# Patient Record
Sex: MALE | Race: WHITE | Employment: FULL TIME | URBAN - METROPOLITAN AREA
[De-identification: names, ages, dates, MRNs, and addresses within clinical notes are randomized per-mention and may not be internally consistent; named-entity substitution may affect disease eponyms.]

---

## 2020-12-29 NOTE — PROGRESS NOTES
Chad Aguilar  [birth year]82 y.o. male   <[id number]>      HEARING AID EVALUATION    Chad Aguilar was seen today, 12/31/2020 , for a Hearing Aid Evaluation following audiologic evaluation on 10/15/20 by Dr. Teo Stover. Patient is an experienced hearing aid user, currently wearing Naidia V70 BTEs Patient is responsible for 100% of the purchasing cost at the time of fitting. It was explained to . Marla Caldwell that due to a lack of insurance information prior to today's appointment we were not able to verify insurance benefits for hearing aids. DATE: 12/31/2020     PROCEDURES:   I spent a significant portion of today's appointment discussing the option of cochlear implants with . Armida Cross. I provided him with contact information to [address] [place] [address] to schedule a cochlear implant evaluation as he may be a candidate. Patient is interested in this option, but would like to proceed with new hearing aids first.     LIFESTYLE EVALUATION:      Primary Complaint: Not hearing well in background noise. Patient reports he wants to be able to hear his sons at home better. Phone Difficulty:   Ear: right   Type: Cell iPhone 10   Is connectivity important to you?: Yes    Kingsley Use: yes - wants use of streaming and remote control    Group Activites: work    TV: does okay with current hearing aids      After a discussion of evaluation results, discussion of levels of technology and prices, and lifestyle assessment. Chad Aguilar has decided to consider the options and contact Audiology when a decision has been made. . Color P5,  power SP, Tab silicone FS molds, AU. Signed medical evaluation waiver and evaluation agreement. Technology to be considered: Phonak Donna M70 SP BTE. Patient cost due at time of fitting: $4750.00    Patient would like to see if his insurance will cover devices. I advised patient to call his insurance to check on this and let us know. I will also call next week as we are entering a new year.  I explained to the patient that once we receive a verification of benefits, any extraneous amount will be due at the time of fitting by the patient. We will submit the remainder to his insurance. I will not place an order until this has been agreed upon by both myself and the patient. He understands and agrees with this process. We will discuss cost after contacting insurance in the next week. EMIs obtained without incident today. I will send out to [facility] for FS silicone molds per request of the patient once we obtain approval from insurance and the patient. No charge for today's appointment. PATIENT EDUCATION:      - Verbally reviewed benefits and limitations of devices and available features in hearing aids. - Verbally discussed realistic expectations of hearing aid use     RECOMMENDATIONS:      - Contact Audiology when a decision has been made to pursue hearing aids.         Caryn Sandoval  Audiologist    NO CHARGE

## 2020-12-31 ENCOUNTER — PROCEDURE VISIT (OUTPATIENT)
Dept: AUDIOLOGY | Age: 40
End: 2020-12-31

## 2020-12-31 PROCEDURE — 99999 PR OFFICE/OUTPT VISIT,PROCEDURE ONLY: CPT | Performed by: AUDIOLOGIST

## 2020-12-31 NOTE — PATIENT INSTRUCTIONS
vibrations. These alert you to the doorbell, a ringing telephone, or a baby monitor. ? Television closed-captioning. This shows the words at the bottom of the screen. Most new TVs can do this. ? TTY (text telephone). This lets you type messages back and forth on the telephone instead of talking or listening. These devices are also called TDD. When messages are typed on the keyboard, they are sent over the phone line to a receiving TTY. The message is shown on a monitor. · Use pagers, fax machines, text, and email if it is hard for you to communicate by telephone. · Try to learn a listening technique called speech-reading. It is not lip-reading. You pay attention to people's gestures, expressions, posture, and tone of voice. These clues can help you understand what a person is saying. Face the person you are talking to, and have him or her face you. Make sure the lighting is good. You need to see the other person's face clearly. · Think about counseling if you need help to adjust to your hearing loss. When should you call for help? Watch closely for changes in your health, and be sure to contact your doctor if:    · You think your hearing is getting worse. · You have new symptoms, such as dizziness or nausea.

## 2021-01-05 ENCOUNTER — TELEPHONE (OUTPATIENT)
Dept: ENT CLINIC | Age: 41
End: 2021-01-05

## 2021-01-05 NOTE — TELEPHONE ENCOUNTER
Spoke with patient. Explained that Chelle Cooper will pay up to $3000 per ear (per device). If he wants more information regarding his out of pocket, he needs to call [employer]-the number on the back of the card. The patient expressed understanding and agreed that he needs to call [employer] for further clarification.

## 2021-01-07 ENCOUNTER — OFFICE VISIT (OUTPATIENT)
Dept: ENT CLINIC | Age: 41
End: 2021-01-07
Payer: COMMERCIAL

## 2021-01-07 VITALS
WEIGHT: 188 LBS | SYSTOLIC BLOOD PRESSURE: 118 MMHG | DIASTOLIC BLOOD PRESSURE: 74 MMHG | TEMPERATURE: 97.8 F | HEART RATE: 109 BPM

## 2021-01-07 DIAGNOSIS — H90.3 SENSORINEURAL HEARING LOSS (SNHL) OF BOTH EARS: Primary | ICD-10-CM

## 2021-01-07 DIAGNOSIS — H61.23 BILATERAL IMPACTED CERUMEN: ICD-10-CM

## 2021-01-07 DIAGNOSIS — H90.5 CONGENITAL DEAFNESS: ICD-10-CM

## 2021-01-07 PROCEDURE — 69210 REMOVE IMPACTED EAR WAX UNI: CPT | Performed by: OTOLARYNGOLOGY

## 2021-01-07 PROCEDURE — 99204 OFFICE O/P NEW MOD 45 MIN: CPT | Performed by: OTOLARYNGOLOGY

## 2021-01-07 NOTE — PROGRESS NOTES
[facility]      Patient Name: Jac Mcgill  Medical Record Number:  <[id number]>  Primary Care Physician:  Naina Peguero MD  Date of Consultation: 1/7/2021    Chief Complaint: Hearing loss, cerumen impactions        HISTORY OF PRESENT Neeta Briseno is a(n) 38 y.o. male who presents for evaluation of hearing loss and cerumen impactions. Patient presents for evaluation of hearing loss. 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 He has had hearing aids since he was 10years old. He does not personally feel as though he has had any significant changes to his hearing in the last several years. Although he has very severe sensorineural hearing loss, he thinks that he does really well with hearing aids. They have discussed cochlear implants in the past, but so far he is not interested. In addition he does not have his ears cleaned out every few months because he gets wax impactions from the hearing aids. Otherwise no new complaints.       Social History     Socioeconomic History    Marital status:      Spouse name: Not on file    Number of children: Not on file    Years of education: Not on file    Highest education level: Not on file   Occupational History    Not on file   Social Needs    Financial resource strain: Not on file    Food insecurity     Worry: Not on file     Inability: Not on file    Transportation needs     Medical: Not on file     Non-medical: Not on file   Tobacco Use    Smoking status: Current Every Day Smoker     Packs/day: 1.00     Years: 22.00     Pack years: 22.00     Types: Cigarettes    Smokeless tobacco: Never Used   Substance and Sexual Activity    Alcohol use: Not on file    Drug use: Not on file    Sexual activity: Not on file   Lifestyle    Physical activity     Days per week: Not on file     Minutes per session: Not on file    Stress: Not on file   Relationships    Social connections     Talks on phone: Not on file     Gets together: Not on file     Attends [employer] service: Not on file     Active member of club or organization: Not on file     Attends meetings of clubs or organizations: Not on file     Relationship status: Not on file    Intimate partner violence     Fear of current or ex partner: Not on file     Emotionally abused: Not on file     Physically abused: Not on file     Forced sexual activity: Not on file   Other Topics Concern    Not on file   Social History Narrative    Not on file       DRUG/FOOD ALLERGIES: Patient has no known allergies. CURRENT MEDICATIONS  Prior to Admission medications    Not on File       REVIEW OF SYSTEMS  The following systems were reviewed and revealed the following in addition to any already discussed in the HPI:    CONSTITUTIONAL: no weight loss, no fever, no night sweats, no chills  EYES: no vision changes, no blurry vision  EARS: Hearing loss  NOSE: no epistaxis, no rhinorrhea  RESPIRATORY: no  Difficulty breathing, no shortness of breath  CV: no chest pain, no Peripheral vascular disease  HEME: No coagulation disorder, no Bleeding disorder  NEURO: no TIA or stroke-like symptoms  SKIN: No new rashes in the head and neck, no recent skin cancers  MOUTH: No new ulcers, no recent teeth infections  GASTROINTESTINAL: No diarrhea, stomach pain  PSYCH: No anxiety, no depression      PHYSICAL EXAM  /74 (Site: Left Upper Arm, Position: Sitting, Cuff Size: Large Adult)   Pulse 109   Temp 97.8 °F (36.6 °C) (Temporal)   Wt 188 lb (85.3 kg)     GENERAL: No Acute Distress, Alert and Oriented, no Hoarseness, strong voice  EYES: EOMI, Anti-icteric  HENT:   Head: Normocephalic and atraumatic.    Face:  Symmetric, facial nerve intact, no sinus tenderness   ears: See below  Mouth/Oral Cavity:  normal lips, Uvula is midline, no mucosal lesions, no trismus, normal dentition, normal salivary quality/flow  Oropharynx/Larynx:  normal oropharynx, normal tonsils; normal larynx/nasopharynx on mirror exam  Nose:Normal external nasal appearance. Anterior rhinoscopy shows a normal septum. Normal turbinates. Normal mucosa   NECK: Normal range of motion, no thyromegaly, trachea is midline, no lymphadenopathy, no neck masses, no crepitus  CHEST: Normal respiratory effort, no retractions, breathing comfortably  SKIN: No rashes, normal appearing skin, no evidence of skin lesions/tumors  Neuro:  cranial nerve II-XII intact; normal gait  Cardio:  no edema    PROCEDURE  Bilateral ear exam  Right ears visualized binoculars scope. The patient had a fairly dense cerumen impaction lying at the surface of tympanic membrane that I removed with a Alanis suction. The left side he had even more dense cerumen impaction that I removed with a combination of a Alanis suction, right angle and alligator forceps. Tympanic membrane was somewhat thickened, but there appeared to be an aerated middle ear      Patient had a hearing test in outside facility a couple of weeks ago that showed essentially severe to profound sensorineural hearing loss bilaterally        ASSESSMENT/PLAN  1. Sensorineural hearing loss (SNHL) of both ears  This patient's hearing loss certainly is in the range that you would consider cochlear implant, however he has been using hearing aids effectively for his entire life. He really just wants to get a new set. I think this is reasonable as he seemed to do pretty good during the exam today as far as communicating even with her mask on. He can follow-up in 3 to 6 months to clean out his ears. 2. Congenital deafness  As above    3. Bilateral impacted cerumen  Removed today in clinic follow-up in 3 to 6 months             I have performed a head and neck physical exam personally or was physically present during the key or critical portions of the service. Medical Decision Making:   The following items were considered in medical decision making:  Independent review of images  Review / order clinical lab tests  Review / order radiology tests  Decision to obtain old records

## 2021-01-15 ENCOUNTER — TELEPHONE (OUTPATIENT)
Dept: AUDIOLOGY | Age: 41
End: 2021-01-15

## 2021-01-15 NOTE — TELEPHONE ENCOUNTER
Returned call to patient and left voicemail. Patient's insurance has told us that he has an up to $3000.00 per ear benefit. At this time his expected out of pocket expense would be $0.00 as the hearing aids discussed in . [facility] are within this benefit. I also explained that the information provided to us from insurance is a verification of benefits and not a guarantee of coverage. Should his insurance not cover at 100% the remaining bill would be between Mr. Elo Khan and his insurance and not [facility] ([facility]). I instructed the patient to call the [place] office if he is interested in ordering devices.

## 2021-01-15 NOTE — TELEPHONE ENCOUNTER
Pt calling to see if Dr Fito Damico knows what his out of pocket expense will be for hearing aids, is requesting she call him to discuss

## 2021-01-18 NOTE — TELEPHONE ENCOUNTER
Patient called back and wants to proceed with ordering the hearing aids that have been discussed. I informed the patient that someone would call him to schedule a hearing aid fitting after the hearing aids come in.

## 2021-02-01 NOTE — PROGRESS NOTES
device manual.          Patient paid $0.00 of total $4750.00 (bill $4750.00 to insurance as patient has a $3000.00 per ear benefit). RECOMMENDATIONS:     - Return for follow-up appointment within 30-day right-to-return period      Caryn Lara  Audiologist    Patient paid $0.00 bill full amount of $4750.00 to insurance.

## 2021-02-04 ENCOUNTER — PROCEDURE VISIT (OUTPATIENT)
Dept: AUDIOLOGY | Age: 41
End: 2021-02-04
Payer: COMMERCIAL

## 2021-02-04 DIAGNOSIS — H90.3 SENSORINEURAL HEARING LOSS (SNHL) OF BOTH EARS: Primary | ICD-10-CM

## 2021-02-04 PROCEDURE — V5264 EAR MOLD/INSERT: HCPCS | Performed by: AUDIOLOGIST

## 2021-02-04 PROCEDURE — V5160 DISPENSING FEE BINAURAL: HCPCS | Performed by: AUDIOLOGIST

## 2021-02-04 PROCEDURE — V5261 HEARING AID, DIGIT, BIN, BTE: HCPCS | Performed by: AUDIOLOGIST

## 2021-02-04 PROCEDURE — V5020 CONFORMITY EVALUATION: HCPCS | Performed by: AUDIOLOGIST

## 2021-02-04 PROCEDURE — V5011 HEARING AID FITTING/CHECKING: HCPCS | Performed by: AUDIOLOGIST

## 2021-02-04 NOTE — PATIENT INSTRUCTIONS
Learning About Hearing Aids  What is a hearing aid? A hearing aid makes sounds louder. It can help some people with hearing problems to hear better. Hearing aids do not restore normal hearing. But they can make it easier to communicate by making sounds clearer. · Digital programmable hearing aids can adjust themselves to work best where you are at any time. You also have more choices in setting them up than with analog hearing aids. There are also different styles of hearing aids. · A behind-the-ear (BTE) hearing aid connects to a plastic ear mold that fits inside the outer ear. BTE hearing aids are used for all levels of hearing loss, especially very severe hearing loss. They may be better for children for safety and growth reasons. Poorly fitting BTE ear molds or a buildup of earwax may cause a whistling sound (feedback). · An in-the-ear (ITE) hearing aid fits in the outer part of the ear. It can be used by people with mild to severe hearing loss. ITE hearing aids can be used with other hearing devices, such as a telecoil that improves hearing during phone calls. ITE hearing aids can be damaged by earwax and fluid draining from the ear. Their small size may be hard for some people to handle. They are not often used in children because the case must be replaced as the child grows. · An in-the-canal (ITC) hearing aid fits into the ear canal. ITC hearing aids are used by people with mild to moderate hearing loss. They are made to fit the shape and the size of your ear canal. They can be damaged by earwax and fluid draining from the ear. Their small size may be hard for some people to handle. They are not made for children. You have some options if you have a hearing problem and are thinking about getting hearing aids. You can go to your doctor or an audiologist. He or she will do a hearing test and help you decide which type and style of hearing aid may be best for you.      What else should I know about hearing aids? Find out if your insurance covers hearing aids. They can be expensive. Different types of hearing aids come with different costs. Also find out about a warranty or return policy in case you are not happy with your hearing aids. Follow-up care is a key part of your treatment and safety. Be sure to make and go to all appointments, and call your doctor if you are having problems. It's also a good idea to know your test results and keep a list of the medicines you take. Where can you learn more? Go to [email or web address] and sign in to your [employer] account. Enter H085 in the Trendr box to learn more about \"Learning About Hearing Aids. \"     If you do not have an account, please click on the \"Sign Up Now\" link. Current as of: October 21, 2018  Content Version: 12.1  © 5990-3489 Healthwise, Incorporated. Care instructions adapted under license by [facility] ([facility]). If you have questions about a medical condition or this instruction, always ask your healthcare professional. [address] any warranty or liability for your use of this information.

## 2021-02-05 NOTE — PROGRESS NOTES
Lori Hutchison   1980, 38 y.o. male   <[id number]>     HEARING AID FITTING FOLLOW-UP  RIGHT EAR: /MODEL: Phonak Donna M70 SP  SN: [id number]  EARMOLD/TUBING: [id number]/ 13 thick    LEFT EAR: /MODEL: Phonak Donna M70 SP  SN: [id number]  EARMOLD/TUBING: [id number]/ 13 thick    BATTERY: 13  HAF: 2/4/21  HA WARRANTY: 4/3/23  EM WARRANTY: 4/15/21  TRIAL PERIOD ENDS:3/4/21     BUTTONS: ENABLED  PROGRAMS: Celiro  PHONE CONNECTIVITY: PAIRED TO PHONE AND TOSIN    Lori Hutchison was seen today, 2/8/2021,  for first follow-up after being fit with Phonak Donna M70 SP BTE  hearing aids. PROCEDURES:     Patient is using his old EMs as he prefers the sound quality. Will RFC new molds today per patient. Replacements ordered (Tab, silicone, long canal, small vent). Ran new feedback manager. Increased MPO. Patient is satisfied with sound quality. We discussed limitations of hearing aids again today. I still advised Mr. Debbie Culver to pursue a CIE. Patient states he will think about this option but he is happy with the hearing aids for now. Patient Education:       - Verbally and visually reviewed care and maintenance of devices. - Reviewed Hearing Aid Fitting checklist and Troubleshooting document given to patient. No charge for today's appointment. RECOMMENDATIONS:     1. Continue wearing both HAs during all waking hours. 2. Return for HA recall 2.  3. Monitor hearing and retest if changes are noticed.     Caryn Laws  Audiologist    NO CHARGE

## 2021-02-08 ENCOUNTER — PROCEDURE VISIT (OUTPATIENT)
Dept: AUDIOLOGY | Age: 41
End: 2021-02-08

## 2021-02-08 DIAGNOSIS — H90.3 SENSORINEURAL HEARING LOSS (SNHL) OF BOTH EARS: Primary | ICD-10-CM

## 2021-02-08 PROCEDURE — 99999 PR OFFICE/OUTPT VISIT,PROCEDURE ONLY: CPT | Performed by: AUDIOLOGIST

## 2021-02-16 NOTE — PROGRESS NOTES
Vin Serna   1980, 38 y.o. male   <[id number]>     HEARING AID FITTING FOLLOW-UP  RIGHT EAR: /MODEL: Phonak Donna M70 SP  RU: [id number]  EARMOLD/TUBING: [id number]/ 13 thick    LEFT EAR: /MODEL: Phonak Donna M70 SP  DQ: [id number]  EARMOLD/TUBING: [id number]/ 13 thick    BATTERY: 13  HAF: 2/4/21  HA WARRANTY: 4/3/23  EM WARRANTY: 4/15/21  TRIAL PERIOD ENDS:3/4/21     BUTTONS: ENABLED  PROGRAMS: Symcircle  PHONE CONNECTIVITY: PAIRED TO PHONE AND TOSIN    Vin Serna was seen today, 2/18/2021,  for first follow-up after being fit with [facility] hearing aids. Patient notes overall he is doing well. Primary complaint is with Bluetooth. He states the car and the hearing aids \"fight\" over the connection to his phone. He also notes that when he is streaming music, the sound cuts out frequently. New ear molds have not arrived yet due to weather delays. I will extend his trial period to 4/5/21 to allow for trial with new ear molds. PROCEDURES:     Hearing aids were cleaned and checked. A listening check revealed good function of the devices. Microphone and  ports were suctioned,  earmolds were cleaned. Post-cleaning listening check revealed excellent function of the devices. Replaced tubing AU, cemented in. Changed bandwidth to adaptive. Patient is satisfied with services today. He will return once new EMs arrive. Patient Education:       - Verbally and visually reviewed care and maintenance of devices. - Reviewed Hearing Aid Fitting checklist and Troubleshooting document given to patient. No charge for today's appointment. RECOMMENDATIONS:     1. Continue wearing both HAs during all waking hours. 2. Return for HA recall when EMs arrive  3. HAC every 3-6 months. 4. Monitor hearing and retest if changes are noticed.     Caryn Santillan  Audiologist    NO CHARGE

## 2021-02-18 ENCOUNTER — PROCEDURE VISIT (OUTPATIENT)
Dept: AUDIOLOGY | Age: 41
End: 2021-02-18

## 2021-02-18 DIAGNOSIS — H90.3 SENSORINEURAL HEARING LOSS (SNHL) OF BOTH EARS: Primary | ICD-10-CM

## 2021-02-18 PROCEDURE — 99999 PR OFFICE/OUTPT VISIT,PROCEDURE ONLY: CPT | Performed by: AUDIOLOGIST

## 2021-02-18 NOTE — PATIENT INSTRUCTIONS
Learning About Hearing Aids  What is a hearing aid? A hearing aid makes sounds louder. It can help some people with hearing problems to hear better. Hearing aids do not restore normal hearing. But they can make it easier to communicate by making sounds clearer. · Digital programmable hearing aids can adjust themselves to work best where you are at any time. You also have more choices in setting them up than with analog hearing aids. There are also different styles of hearing aids. · A behind-the-ear (BTE) hearing aid connects to a plastic ear mold that fits inside the outer ear. BTE hearing aids are used for all levels of hearing loss, especially very severe hearing loss. They may be better for children for safety and growth reasons. Poorly fitting BTE ear molds or a buildup of earwax may cause a whistling sound (feedback). · An in-the-ear (ITE) hearing aid fits in the outer part of the ear. It can be used by people with mild to severe hearing loss. ITE hearing aids can be used with other hearing devices, such as a telecoil that improves hearing during phone calls. ITE hearing aids can be damaged by earwax and fluid draining from the ear. Their small size may be hard for some people to handle. They are not often used in children because the case must be replaced as the child grows. · An in-the-canal (ITC) hearing aid fits into the ear canal. ITC hearing aids are used by people with mild to moderate hearing loss. They are made to fit the shape and the size of your ear canal. They can be damaged by earwax and fluid draining from the ear. Their small size may be hard for some people to handle. They are not made for children. You have some options if you have a hearing problem and are thinking about getting hearing aids. You can go to your doctor or an audiologist. He or she will do a hearing test and help you decide which type and style of hearing aid may be best for you.      What else should I know

## 2021-02-24 NOTE — PROGRESS NOTES
Lori Hutchison   1980, 38 y.o. male   <[id number]>     HEARING AID CHECK  RIGHT EAR: /MODEL: Phonak Donna M70 SP  CC: [id number]  EARMOLD/TUBING: [id number]/ 13 thick    LEFT EAR: /MODEL: Phonak Donna M70 SP  TV: [id number]  EARMOLD/TUBING: [id number]/ 13 thick    BATTERY: 13  HAF: 2/4/21  HA WARRANTY: 4/3/23  EM WARRANTY: 5/12/21  TRIAL PERIOD ENDS:3/4/21     BUTTONS: ENABLED  PROGRAMS: AUTOSENFansUnite  PHONE CONNECTIVITY: PAIRED TO PHONE AND TOSIN      Lori Hutchison was seen today, 2/25/2021, for a hearing aid check appointment. Fit with replacement EMs today. Patient was not satisfied with the fit. Will send in for remake as they are still in warranty. Took new EMIs today that will be sent with EMs. Enabled speech recover. Enhanced wind guard.      RECOMMENDATIONS:  - Continue consistent hearing aid use  - Return for hearing aid checks as needed  - Will call patient once new EMs are in      Hoang Caryn Gonzalez  Audiologist      NO CHARGE

## 2021-02-25 ENCOUNTER — PROCEDURE VISIT (OUTPATIENT)
Dept: AUDIOLOGY | Age: 41
End: 2021-02-25

## 2021-02-25 DIAGNOSIS — H90.3 SENSORINEURAL HEARING LOSS (SNHL) OF BOTH EARS: Primary | ICD-10-CM

## 2021-02-25 PROCEDURE — 99999 PR OFFICE/OUTPT VISIT,PROCEDURE ONLY: CPT | Performed by: AUDIOLOGIST

## 2021-02-25 NOTE — PATIENT INSTRUCTIONS
Learning About Hearing Aids  What is a hearing aid? A hearing aid makes sounds louder. It can help some people with hearing problems to hear better. Hearing aids do not restore normal hearing. But they can make it easier to communicate by making sounds clearer. · Digital programmable hearing aids can adjust themselves to work best where you are at any time. You also have more choices in setting them up than with analog hearing aids. There are also different styles of hearing aids. · A behind-the-ear (BTE) hearing aid connects to a plastic ear mold that fits inside the outer ear. BTE hearing aids are used for all levels of hearing loss, especially very severe hearing loss. They may be better for children for safety and growth reasons. Poorly fitting BTE ear molds or a buildup of earwax may cause a whistling sound (feedback). · An in-the-ear (ITE) hearing aid fits in the outer part of the ear. It can be used by people with mild to severe hearing loss. ITE hearing aids can be used with other hearing devices, such as a telecoil that improves hearing during phone calls. ITE hearing aids can be damaged by earwax and fluid draining from the ear. Their small size may be hard for some people to handle. They are not often used in children because the case must be replaced as the child grows. · An in-the-canal (ITC) hearing aid fits into the ear canal. ITC hearing aids are used by people with mild to moderate hearing loss. They are made to fit the shape and the size of your ear canal. They can be damaged by earwax and fluid draining from the ear. Their small size may be hard for some people to handle. They are not made for children. You have some options if you have a hearing problem and are thinking about getting hearing aids. You can go to your doctor or an audiologist. He or she will do a hearing test and help you decide which type and style of hearing aid may be best for you.      What else should I know about hearing aids? Find out if your insurance covers hearing aids. They can be expensive. Different types of hearing aids come with different costs. Also find out about a warranty or return policy in case you are not happy with your hearing aids. Follow-up care is a key part of your treatment and safety. Be sure to make and go to all appointments, and call your doctor if you are having problems. It's also a good idea to know your test results and keep a list of the medicines you take. Where can you learn more? Go to https://Arden ReedpepicewJunar.trbo GmbH. org and sign in to your [employer] account. Enter R130 in the lifecake box to learn more about \"Learning About Hearing Aids. \"     If you do not have an account, please click on the \"Sign Up Now\" link. Current as of: October 21, 2018  Content Version: 12.1  © 4643-2172 Healthwise, Incorporated. Care instructions adapted under license by [facility] ([facility]). If you have questions about a medical condition or this instruction, always ask your healthcare professional. [address] any warranty or liability for your use of this information.

## 2021-03-11 ENCOUNTER — TELEPHONE (OUTPATIENT)
Dept: ENT CLINIC | Age: 41
End: 2021-03-11

## 2021-03-15 ENCOUNTER — TELEPHONE (OUTPATIENT)
Dept: ENT CLINIC | Age: 41
End: 2021-03-15

## 2021-03-15 NOTE — TELEPHONE ENCOUNTER
Tried to call patient. Message says that number has been changed or disconnected. Will try to reach one of his emergency contacts.

## 2021-04-20 NOTE — PROGRESS NOTES
Lisa Porter   1980, 38 y.o. male   <[id number]>     HEARING AID CHECK  RIGHT EAR: /MODEL: Phonak Donna M70 SP  LX: [id number]  EARMOLD/TUBING: [id number]/ 13 thick TRS    LEFT EAR: /MODEL: Phonak Donna M70 SP  IY: [id number]  EARMOLD/TUBING: [id number]/ 13 thick TRS   BATTERY: 13  HAF: 2/4/21  HA WARRANTY: 4/3/23  EM WARRANTY: 5/12/21     BUTTONS: ENABLED  PROGRAMS: [employer]  PHONE CONNECTIVITY: PAIRED TO PHONE AND TOSIN      Lisa Porter was seen today, 4/21/2021, for a hearing aid check appointment. Fit patient with new earmolds, he noted good fit. Re-ran feedback manager with devices. Patient reports good sound quality. Speech mapping NAL-NAL2 shows good benefit from devices.          RECOMMENDATIONS:  - Continue consistent hearing aid use  - Return for hearing aid checks as needed      Caryn Blue  Audiologist    NO CHARGE

## 2021-04-21 ENCOUNTER — PROCEDURE VISIT (OUTPATIENT)
Dept: AUDIOLOGY | Age: 41
End: 2021-04-21

## 2021-04-21 DIAGNOSIS — H90.3 SENSORINEURAL HEARING LOSS (SNHL) OF BOTH EARS: Primary | ICD-10-CM

## 2021-04-21 PROCEDURE — 99999 PR OFFICE/OUTPT VISIT,PROCEDURE ONLY: CPT | Performed by: AUDIOLOGIST

## 2021-04-21 NOTE — PATIENT INSTRUCTIONS
Learning About Hearing Aids  What is a hearing aid? A hearing aid makes sounds louder. It can help some people with hearing problems to hear better. Hearing aids do not restore normal hearing. But they can make it easier to communicate by making sounds clearer. · Digital programmable hearing aids can adjust themselves to work best where you are at any time. You also have more choices in setting them up than with analog hearing aids. There are also different styles of hearing aids. · A behind-the-ear (BTE) hearing aid connects to a plastic ear mold that fits inside the outer ear. BTE hearing aids are used for all levels of hearing loss, especially very severe hearing loss. They may be better for children for safety and growth reasons. Poorly fitting BTE ear molds or a buildup of earwax may cause a whistling sound (feedback). · An in-the-ear (ITE) hearing aid fits in the outer part of the ear. It can be used by people with mild to severe hearing loss. ITE hearing aids can be used with other hearing devices, such as a telecoil that improves hearing during phone calls. ITE hearing aids can be damaged by earwax and fluid draining from the ear. Their small size may be hard for some people to handle. They are not often used in children because the case must be replaced as the child grows. · An in-the-canal (ITC) hearing aid fits into the ear canal. ITC hearing aids are used by people with mild to moderate hearing loss. They are made to fit the shape and the size of your ear canal. They can be damaged by earwax and fluid draining from the ear. Their small size may be hard for some people to handle. They are not made for children. You have some options if you have a hearing problem and are thinking about getting hearing aids. You can go to your doctor or an audiologist. He or she will do a hearing test and help you decide which type and style of hearing aid may be best for you.      What else should I know about hearing aids? Find out if your insurance covers hearing aids. They can be expensive. Different types of hearing aids come with different costs. Also find out about a warranty or return policy in case you are not happy with your hearing aids. Follow-up care is a key part of your treatment and safety. Be sure to make and go to all appointments, and call your doctor if you are having problems. It's also a good idea to know your test results and keep a list of the medicines you take. Where can you learn more? Go to https://RentamuspepicewSecureAlert.BATTERIES [email or web address] BANDS. org and sign in to your [employer] account. Enter F447 in the [employer] box to learn more about \"Learning About Hearing Aids. \"     If you do not have an account, please click on the \"Sign Up Now\" link. Current as of: October 21, 2018  Content Version: 12.1  © 6167-2818 Healthwise, Incorporated. Care instructions adapted under license by [facility] ([facility]). If you have questions about a medical condition or this instruction, always ask your healthcare professional. [address] any warranty or liability for your use of this information.

## 2021-04-22 ENCOUNTER — TELEPHONE (OUTPATIENT)
Dept: AUDIOLOGY | Age: 41
End: 2021-04-22

## 2021-04-22 ENCOUNTER — PROCEDURE VISIT (OUTPATIENT)
Dept: AUDIOLOGY | Age: 41
End: 2021-04-22

## 2021-04-22 DIAGNOSIS — H90.3 SENSORINEURAL HEARING LOSS (SNHL) OF BOTH EARS: Primary | ICD-10-CM

## 2021-04-22 PROCEDURE — 99999 PR OFFICE/OUTPT VISIT,PROCEDURE ONLY: CPT | Performed by: AUDIOLOGIST

## 2021-04-22 NOTE — PROGRESS NOTES
Jennie Molina   1980, 38 y.o. male   <[id number]>     HEARING AID CHECK    RIGHT EAR: /MODEL: Phonak Donna M70 SP  MQ: [id number]  EARMOLD/TUBING: [id number]/ 13 thick TRS    LEFT EAR: /MODEL: Phonak Donna M70 SP  GM: [id number]  EARMOLD/TUBING: [id number]/ 13 thick TRS   BATTERY: 13  HAF: 2/4/21  HA WARRANTY: 4/3/23  EM WARRANTY: 5/12/21     BUTTONS: ENABLED  PROGRAMS: "[employer]  PHONE CONNECTIVITY: PAIRED TO PHONE AND TOSIN    Jennie Molina was seen today, 4/22/2021, for a hearing aid check appointment. Patient noted he has been getting feedback since being fit with the new ear molds. Sending back for second remake, will contact [employer] to see if this can be a rush order. Will call patient when EMs are back and ready to be fit.          RECOMMENDATIONS:  - Continue consistent hearing aid use  - Return for remake EMs when back from Caryn Overton  Audiologist    NO CHARGE    NO CHARGE

## 2021-04-23 ENCOUNTER — TELEPHONE (OUTPATIENT)
Dept: AUDIOLOGY | Age: 41
End: 2021-04-23

## 2021-04-23 NOTE — TELEPHONE ENCOUNTER
Pt calling, requesting call from Dr Rupal La regarding his hearing test. Pt aware Dr Rupal La out of office on Fridays.

## 2021-04-23 NOTE — TELEPHONE ENCOUNTER
Returned call to patient. He wanted to review his audiogram from [facility]. We discussed again that he has a bilateral severe to profound SNHL. Patient has very poor WRS bilaterally. He states that \"it just hit home\" that he should pursue the cochlear implant evaluation as I had advised. Again reviewed contact information to [address] [place] [address] to schedule a cochlear implant evaluation (CIE). Advised patient to bring a copy of his most recent audiogram to this appointment. Patient states he is going to schedule a CIE and will contact me if he has further questions.

## 2021-05-07 ENCOUNTER — TELEPHONE (OUTPATIENT)
Dept: ENT CLINIC | Age: 41
End: 2021-05-07

## 2021-05-09 ENCOUNTER — APPOINTMENT (OUTPATIENT)
Dept: CT IMAGING | Age: 41
End: 2021-05-09
Payer: COMMERCIAL

## 2021-05-09 ENCOUNTER — HOSPITAL ENCOUNTER (EMERGENCY)
Age: 41
Discharge: LEFT BEFORE DISCHARGE | End: 2021-05-09
Attending: EMERGENCY MEDICINE
Payer: COMMERCIAL

## 2021-05-09 VITALS
SYSTOLIC BLOOD PRESSURE: 130 MMHG | HEART RATE: 80 BPM | OXYGEN SATURATION: 99 % | WEIGHT: 193.56 LBS | TEMPERATURE: 97.5 F | RESPIRATION RATE: 16 BRPM | DIASTOLIC BLOOD PRESSURE: 78 MMHG

## 2021-05-09 DIAGNOSIS — F10.920 ACUTE ALCOHOLIC INTOXICATION WITHOUT COMPLICATION (HCC): Primary | ICD-10-CM

## 2021-05-09 LAB
A/G RATIO: 1.8 (ref 1.1–2.2)
ACETAMINOPHEN LEVEL: <5 UG/ML (ref 10–30)
ALBUMIN SERPL-MCNC: 4.5 G/DL (ref 3.4–5)
ALP BLD-CCNC: 54 U/L (ref 40–129)
ALT SERPL-CCNC: 13 U/L (ref 10–40)
AMPHETAMINE SCREEN, URINE: NORMAL
ANION GAP SERPL CALCULATED.3IONS-SCNC: 19 MMOL/L (ref 3–16)
AST SERPL-CCNC: 13 U/L (ref 15–37)
BARBITURATE SCREEN URINE: NORMAL
BASOPHILS ABSOLUTE: 0.1 K/UL (ref 0–0.2)
BASOPHILS RELATIVE PERCENT: 0.4 %
BENZODIAZEPINE SCREEN, URINE: NORMAL
BILIRUB SERPL-MCNC: 0.4 MG/DL (ref 0–1)
BUN BLDV-MCNC: 11 MG/DL (ref 7–20)
CALCIUM SERPL-MCNC: 8.8 MG/DL (ref 8.3–10.6)
CANNABINOID SCREEN URINE: NORMAL
CHLORIDE BLD-SCNC: 102 MMOL/L (ref 99–110)
CHP ED QC CHECK: NORMAL
CO2: 19 MMOL/L (ref 21–32)
COCAINE METABOLITE SCREEN URINE: NORMAL
CREAT SERPL-MCNC: 0.9 MG/DL (ref 0.9–1.3)
EOSINOPHILS ABSOLUTE: 0.3 K/UL (ref 0–0.6)
EOSINOPHILS RELATIVE PERCENT: 1.9 %
ETHANOL: 118 MG/DL (ref 0–0.08)
GFR AFRICAN AMERICAN: >60
GFR NON-AFRICAN AMERICAN: >60
GLOBULIN: 2.5 G/DL
GLUCOSE BLD-MCNC: 84 MG/DL (ref 70–99)
GLUCOSE BLD-MCNC: 86 MG/DL
GLUCOSE BLD-MCNC: 86 MG/DL (ref 70–99)
HCT VFR BLD CALC: 46.1 % (ref 40.5–52.5)
HEMOGLOBIN: 15.9 G/DL (ref 13.5–17.5)
LYMPHOCYTES ABSOLUTE: 3.8 K/UL (ref 1–5.1)
LYMPHOCYTES RELATIVE PERCENT: 24.8 %
Lab: NORMAL
MCH RBC QN AUTO: 29.9 PG (ref 26–34)
MCHC RBC AUTO-ENTMCNC: 34.5 G/DL (ref 31–36)
MCV RBC AUTO: 86.7 FL (ref 80–100)
METHADONE SCREEN, URINE: NORMAL
MONOCYTES ABSOLUTE: 0.9 K/UL (ref 0–1.3)
MONOCYTES RELATIVE PERCENT: 5.9 %
NEUTROPHILS ABSOLUTE: 10.2 K/UL (ref 1.7–7.7)
NEUTROPHILS RELATIVE PERCENT: 67 %
OPIATE SCREEN URINE: NORMAL
OXYCODONE URINE: NORMAL
PDW BLD-RTO: 14.3 % (ref 12.4–15.4)
PERFORMED ON: NORMAL
PH UA: 6
PHENCYCLIDINE SCREEN URINE: NORMAL
PLATELET # BLD: 259 K/UL (ref 135–450)
PMV BLD AUTO: 7.7 FL (ref 5–10.5)
POTASSIUM SERPL-SCNC: 3.5 MMOL/L (ref 3.5–5.1)
PROPOXYPHENE SCREEN: NORMAL
RBC # BLD: 5.31 M/UL (ref 4.2–5.9)
SALICYLATE, SERUM: <0.3 MG/DL (ref 15–30)
SODIUM BLD-SCNC: 140 MMOL/L (ref 136–145)
TOTAL PROTEIN: 7 G/DL (ref 6.4–8.2)
WBC # BLD: 15.2 K/UL (ref 4–11)

## 2021-05-09 PROCEDURE — 2580000003 HC RX 258: Performed by: EMERGENCY MEDICINE

## 2021-05-09 PROCEDURE — 85025 COMPLETE CBC W/AUTO DIFF WBC: CPT

## 2021-05-09 PROCEDURE — 70450 CT HEAD/BRAIN W/O DYE: CPT

## 2021-05-09 PROCEDURE — 80143 DRUG ASSAY ACETAMINOPHEN: CPT

## 2021-05-09 PROCEDURE — 82077 ASSAY SPEC XCP UR&BREATH IA: CPT

## 2021-05-09 PROCEDURE — 80307 DRUG TEST PRSMV CHEM ANLYZR: CPT

## 2021-05-09 PROCEDURE — 72125 CT NECK SPINE W/O DYE: CPT

## 2021-05-09 PROCEDURE — 99284 EMERGENCY DEPT VISIT MOD MDM: CPT

## 2021-05-09 PROCEDURE — 80053 COMPREHEN METABOLIC PANEL: CPT

## 2021-05-09 PROCEDURE — 80179 DRUG ASSAY SALICYLATE: CPT

## 2021-05-09 RX ORDER — 0.9 % SODIUM CHLORIDE 0.9 %
1000 INTRAVENOUS SOLUTION INTRAVENOUS ONCE
Status: COMPLETED | OUTPATIENT
Start: 2021-05-09 | End: 2021-05-09

## 2021-05-09 RX ORDER — ROPINIROLE 1 MG/1
1 TABLET, FILM COATED ORAL 3 TIMES DAILY
COMMUNITY

## 2021-05-09 RX ADMIN — SODIUM CHLORIDE 1000 ML: 9 INJECTION, SOLUTION INTRAVENOUS at 18:36

## 2021-05-09 NOTE — ED NOTES
Patients family member at bedside. Patient talking and responding to her.   He was able to stand at bedside with family member to urinate in urinal.  Patient now resting with eyes closed      Yovanny Olivia RN  05/09/21 1916

## 2021-05-09 NOTE — ED NOTES
Bed: A-16  Expected date:   Expected time:   Means of arrival: [address] [facility]  Comments:  40M unconscious     Shana Curtis RN  05/09/21 5643

## 2021-05-09 NOTE — ED NOTES
Pt requesting to leave, Dr Beronica Lai made aware and at bedside     Sonja Ramirez, [facility]  05/09/21 8888

## 2021-05-09 NOTE — ED PROVIDER NOTES
Triage Chief Complaint:   Alcohol Intoxication and Drug Overdose (Requip)    [place]:  Beulah Llanos is a 38 y.o. male that presents for altered mental status per report the patient was drinking alcohol earlier today. The patient per report does take Requip for restless leg syndrome however there is no report that he overdosed on such, this was just contributory information given by EMS. The patient arrives clearly intoxicated but protecting his airway. ROS:  Cannot assess      No past medical history on file. No past surgical history on file. No family history on file.   Social History     Socioeconomic History    Marital status:      Spouse name: Not on file    Number of children: Not on file    Years of education: Not on file    Highest education level: Not on file   Occupational History    Not on file   Social Needs    Financial resource strain: Not on file    Food insecurity     Worry: Not on file     Inability: Not on file    Transportation needs     Medical: Not on file     Non-medical: Not on file   Tobacco Use    Smoking status: Current Every Day Smoker     Packs/day: 1.00     Years: 22.00     Pack years: 22.00     Types: Cigarettes    Smokeless tobacco: Never Used   Substance and Sexual Activity    Alcohol use: Not on file    Drug use: Not on file    Sexual activity: Not on file   Lifestyle    Physical activity     Days per week: Not on file     Minutes per session: Not on file    Stress: Not on file   Relationships    Social connections     Talks on phone: Not on file     Gets together: Not on file     Attends Synagogue service: Not on file     Active member of club or organization: Not on file     Attends meetings of clubs or organizations: Not on file     Relationship status: Not on file    Intimate partner violence     Fear of current or ex partner: Not on file     Emotionally abused: Not on file     Physically abused: Not on file     Forced sexual activity: Not on file Other Topics Concern    Not on file   Social History Narrative    Not on file     Current Facility-Administered Medications   Medication Dose Route Frequency Provider Last Rate Last Admin    0.9 % sodium chloride bolus  1,000 mL Intravenous Once Arianna Lynne MD         Current Outpatient Medications   Medication Sig Dispense Refill    rOPINIRole (REQUIP) 1 MG tablet Take 1 mg by mouth 3 times daily       No Known Allergies    [unfilled]    Nursing Notes Reviewed    Physical Exam:  Vitals:    05/09/21 1751   BP: (!) 137/91   Pulse: 93   Resp: 19   SpO2: 96%       GENERAL APPEARANCE: Toxic and male  HEAD: Normocephalic. Atraumatic. EYES: EOM's grossly intact. Sclera anicteric. ENT: Mucous membranes are moist. Tolerates saliva. No trismus. NECK: Supple. No meningismus. Trachea midline. HEART: RRR. Radial pulses 2+. LUNGS: Respirations unlabored. CTAB  ABDOMEN: Soft. Non-tender. No guarding or rebound. EXTREMITIES: No acute deformities. SKIN: Warm and dry. NEUROLOGICAL: Currently intoxicated    I have reviewed and interpreted all of the currently available lab results from this visit (if applicable):  No results found for this visit on 05/09/21. Radiographs (if obtained):  [] The following radiograph was interpreted by myself in the absence of a radiologist:  [x] Radiologist's Report Reviewed:     CT HEAD [address] (Preliminary result)  Result time 05/09/21 18:31:29  Preliminary result by Ralph Ortiz MD (05/09/21 18:31:29)                Impression:    No acute intracranial abnormality. Narrative:    EXAMINATION:   CT OF THE HEAD WITHOUT CONTRAST  5/9/2021 6:04 pm     TECHNIQUE:   CT of the head was performed without the administration of intravenous   contrast. Dose modulation, iterative reconstruction, and/or weight based   adjustment of the mA/kV was utilized to reduce the radiation dose to as low   as reasonably achievable. COMPARISON:   None.      HISTORY: ORDERING SYSTEM PROVIDED HISTORY: AMS   TECHNOLOGIST PROVIDED HISTORY:   Reason for exam:->AMS   Has a \"code stroke\" or \"stroke alert\" been called? ->No   Decision Support Exception - unselect if not a suspected or confirmed   emergency medical condition->Emergency Medical Condition (MA)   Reason for Exam: Alcohol Intoxication; Drug Overdose, found down   Acuity: Acute   Type of Exam: Initial     FINDINGS:   BRAIN/VENTRICLES: There is no acute intracranial hemorrhage, mass effect or   midline shift.  No abnormal extra-axial fluid collection.  The gray-white   differentiation is maintained without evidence of an acute infarct.  There is   no evidence of hydrocephalus. No focus of acute abnormal brain attenuation is   identified. ORBITS: The visualized portion of the orbits demonstrate no acute abnormality. SINUSES: There is mild-to-moderate mucosal thickening within the bilateral   maxillary sinuses, bilateral ethmoid sinuses, and right frontal sinus.  The   mastoids are clear bilaterally. SOFT TISSUES/SKULL:  No acute abnormality of the visualized skull or soft   tissues.                 Preliminary result by Jordyn Osorio MD (05/09/21 18:28:46)                Impression:    No acute intracranial abnormality.                     CT CERVICAL SPINE WO CONTRAST (Preliminary result)  Result time 05/09/21 18:26:04  Preliminary result by Nan Miles MD (05/09/21 18:26:04)                Impression:    No evidence of cervical spine fracture or listhesis. Narrative:    EXAMINATION:   CT OF THE CERVICAL SPINE WITHOUT CONTRAST 5/9/2021 6:03 pm     TECHNIQUE:   CT of the cervical spine was performed without the administration of   intravenous contrast. Multiplanar reformatted images are provided for review. Dose modulation, iterative reconstruction, and/or weight based adjustment of   the mA/kV was utilized to reduce the radiation dose to as low as reasonably   achievable.      COMPARISON: None     HISTORY:   ORDERING SYSTEM PROVIDED HISTORY: found down   TECHNOLOGIST PROVIDED HISTORY:   Reason for exam:->found down   Decision Support Exception - unselect if not a suspected or confirmed   emergency medical condition->Emergency Medical Condition (MA)   Reason for Exam: Alcohol Intoxication; Drug Overdose   Acuity: Acute   Type of Exam: Initial     FINDINGS:   BONES/ALIGNMENT: Cervical alignment and vertebral body heights are   maintained.  Facets align normally. DEGENERATIVE CHANGES: No significant degenerative changes. SOFT TISSUES: There is no prevertebral soft tissue swelling.                 Preliminary result by Charlotte Luna MD (05/09/21 18:24:48)                Impression:    1. No evidence of cervical spine fracture or listhesis. EKG (if obtained): (All EKG's are interpreted by myself in the absence of a cardiologist)  Initial EKG on my interpretation shows n/a    MDM:  Differential diagnosis: Alcohol intoxication, drug overdose, intentional self injures behavior    The patient arrives intoxicated. His girlfriend Josefina Castro came to the ED and discussed what happened today. She states that he had been drinking etoh throughout the day without eating any food. He states he does take a medication for restless legs but she has the bottle with her and there do not appear to be any missing pills nor was there any evidence that he ingested such. The patient's white count is 15 and his EtOH is 118 however the remainder of his labs are reassuring. UDS neg. CT of the head and cervical spine is unremarkable. The patient was kept here for observation to the point where by mathematical calculation his EtOH level would be under 100 and he was reassessed. He is now alert awake oriented. He is very polite and cooperative.   He states that he did drink alcohol today without eating partially because of the stress of his first Mother's Day without his mother however he clearly denies

## 2021-05-10 ENCOUNTER — PROCEDURE VISIT (OUTPATIENT)
Dept: AUDIOLOGY | Age: 41
End: 2021-05-10

## 2021-05-10 DIAGNOSIS — H90.3 SENSORINEURAL HEARING LOSS (SNHL) OF BOTH EARS: Primary | ICD-10-CM

## 2021-05-10 PROCEDURE — 99999 PR OFFICE/OUTPT VISIT,PROCEDURE ONLY: CPT | Performed by: AUDIOLOGIST

## 2021-05-18 ENCOUNTER — TELEPHONE (OUTPATIENT)
Dept: AUDIOLOGY | Age: 41
End: 2021-05-18

## 2021-05-24 ENCOUNTER — PROCEDURE VISIT (OUTPATIENT)
Dept: AUDIOLOGY | Age: 41
End: 2021-05-24

## 2021-05-24 DIAGNOSIS — H90.3 SENSORINEURAL HEARING LOSS (SNHL) OF BOTH EARS: Primary | ICD-10-CM

## 2021-05-24 PROCEDURE — 99999 PR OFFICE/OUTPT VISIT,PROCEDURE ONLY: CPT | Performed by: AUDIOLOGIST

## 2021-05-27 ENCOUNTER — TELEPHONE (OUTPATIENT)
Dept: AUDIOLOGY | Age: 41
End: 2021-05-27

## 2021-06-16 NOTE — PROGRESS NOTES
Mary Lou Greene   1980, 38 y.o. male   <[id number]>     HEARING AID CHECK    RIGHT EAR: /MODEL: Phonak Donna M70 SP  AE: [id number]  EARMOLD/TUBING: [id number]/ 13 thick TRS    LEFT EAR: /MODEL: Phonak Donna M70 SP  RD: [id number]  EARMOLD/TUBING: [id number]/ 13 thick TRS   BATTERY: 13  HAF: 21  HA WARRANTY: 4/3/23  EM WARRANTY: 21     BUTTONS: ENABLED  PROGRAMS: AUTOSENSE  PHONE CONNECTIVITY: PAIRED TO PHONE AND TOSIN    Mary Lou Greene was seen today, 2021, for a hearing aid check. Repaired device returned from  with new EMs. Mary Lou Greene   1980, 38 y.o. male   <[id number]>     HEARING AID FITTING      RIGHT EAR: /MODEL: Phonak Donna P70 UP  SN: [id number]  EARMOLD/TUBING: [id number]/ 13 thick TRS    LEFT EAR: /MODEL: Phonak Donna P70 UP  SN: [id number] EARMOLD/TUBING: [id number]/ 13 thick TRS   BATTERIES: 675  HAF: 21  WARRANTY: 24  TRIAL PERIOD ENDS:21  L&D ELIGIBLE: Yes    BUTTONS: ENABLED  PROGRAMS: AUTOSENSE  PHONE CONNECTIVITY: PAIRED TO PHONE AND TOSIN      Mary Lou Greene was seen today,2021,  to be fit with Phonak Donna P70 UP  hearing aids. Hearing aids were programmed to 110% with previous session programming. Fit is good. BATTERY Device orientation was completed, includin. Hearing aid insertion/removal   2. Buttons/Indicators   3. Battery size, where to purchase, life expectancy     4. Battery insertion/removal     5. Cleaning/maintenance of the device     6. Loss & Damage/Repair warranty information   7. 30-day right-to-return period    Mary Lou Greene noted good sound quality. It was recommended that patient return for a follow-up appointment within the 30-day right-to-return period for further programming adjustments and education of the devices as warranted.       PATIENT EDUCATION:       Mary Lou Greene was provided with the Hearing Aid Fitting Checklist as a reference of items discussed at today's appointment. Patient may find additional product information in the provided hearing aid  device manual.      Patient billed on 2/14/21, do not bill patient.     RECOMMENDATIONS:     - Return for follow-up appointment within 30-day right-to-return period.  - HAC 2-3 weeks      Caryn Condon  Audiologist    NO CHARGE

## 2021-06-17 ENCOUNTER — PROCEDURE VISIT (OUTPATIENT)
Dept: AUDIOLOGY | Age: 41
End: 2021-06-17

## 2021-06-17 DIAGNOSIS — H90.3 SENSORINEURAL HEARING LOSS (SNHL) OF BOTH EARS: Primary | ICD-10-CM

## 2021-06-17 PROCEDURE — 99999 PR OFFICE/OUTPT VISIT,PROCEDURE ONLY: CPT | Performed by: AUDIOLOGIST

## 2021-06-30 ENCOUNTER — PROCEDURE VISIT (OUTPATIENT)
Dept: AUDIOLOGY | Age: 41
End: 2021-06-30

## 2021-06-30 DIAGNOSIS — H90.3 SENSORINEURAL HEARING LOSS (SNHL) OF BOTH EARS: Primary | ICD-10-CM

## 2021-06-30 PROCEDURE — 99999 PR OFFICE/OUTPT VISIT,PROCEDURE ONLY: CPT | Performed by: AUDIOLOGIST

## 2021-06-30 NOTE — PROGRESS NOTES
Verona Hoffmann   1980, 38 y.o. male   <[id number]>     HEARING AID CHECK    RIGHT EAR: /MODEL: Phonak Donna P70 UP  SN: [id number]  EARMOLD/TUBING: [id number]/ 13 thick TRS    LEFT EAR: /MODEL: Phonak Donna P70 UP  SN: [id number] EARMOLD/TUBING: [id number]/ 13 thick TRS   BATTERIES: 675  HAF: 6/17/21  WARRANTY: 8/30/24  TRIAL PERIOD ENDS:7/17/21  L&D ELIGIBLE: Yes     BUTTONS: ENABLED  PROGRAMS: 1) AUTOSENSE    2) Phone  PHONE CONNECTIVITY: PAIRED TO PHONE AND TOSIN    Verona Hoffmann was seen today, 6/30/2021, for a hearing aid check appointment. Patient noted he feels like the hearing aids need to be turned up. Added program for landline with less background noise. Discussed limitations of gain on current device. Patient did not bring his most recent audiogram to today's appointment. I requested that he bring it to the next one.          RECOMMENDATIONS:  - Continue consistent hearing aid use  - Return for hearing aid checks as needed    Caryn Reagan  Audiologist    NO CHARGE

## 2021-07-06 NOTE — PROGRESS NOTES
Sean Hu   1980, 38 y.o. male   <[id number]>     HEARING AID CHECK    RIGHT EAR: /MODEL: Phonak Donna P70 UP  TW: [id number]  EARMOLD/TUBING: [id number]/ 13 thick TRS    LEFT EAR: /MODEL: Phonak Donna P70 UP  AK: [id number] GJVOFFR/QPWSLT: [id number]/ 13 thick TRS   BATTERIES: 675  HAF: 6/17/21  WARRANTY: 8/30/24  TRIAL PERIOD ENDS:7/17/21  L&D ELIGIBLE: Yes     BUTTONS: ENABLED  PROGRAMS: 1) AUTOSENSE                          2) Phone  PHONE CONNECTIVITY: PAIRED TO PHONE AND TOSIN    Sean Hu was seen today, 7/7/2021, for a hearing aid check appointment. Changed to Phonak contrast algorithm. Increased background noise management. Discussed Alisha Garcia with patient today.  Will talk with Fernanda Lynch about promotions and will call patient with cost.          RECOMMENDATIONS:  - Continue consistent hearing aid use  - Return for hearing aid checks as needed      Earlis Duverney, AuD  Audiologist      NO CHARGE

## 2021-07-07 ENCOUNTER — PROCEDURE VISIT (OUTPATIENT)
Dept: AUDIOLOGY | Age: 41
End: 2021-07-07

## 2021-07-07 ENCOUNTER — OFFICE VISIT (OUTPATIENT)
Dept: ENT CLINIC | Age: 41
End: 2021-07-07
Payer: COMMERCIAL

## 2021-07-07 VITALS
HEART RATE: 91 BPM | SYSTOLIC BLOOD PRESSURE: 116 MMHG | HEIGHT: 68 IN | WEIGHT: 195 LBS | BODY MASS INDEX: 29.55 KG/M2 | DIASTOLIC BLOOD PRESSURE: 77 MMHG

## 2021-07-07 DIAGNOSIS — J34.3 HYPERTROPHY OF INFERIOR NASAL TURBINATE: ICD-10-CM

## 2021-07-07 DIAGNOSIS — J34.2 DEVIATED SEPTUM: ICD-10-CM

## 2021-07-07 DIAGNOSIS — H90.6 MIXED CONDUCTIVE AND SENSORINEURAL HEARING LOSS OF BOTH EARS: Primary | ICD-10-CM

## 2021-07-07 DIAGNOSIS — J30.9 ALLERGIC RHINITIS, UNSPECIFIED SEASONALITY, UNSPECIFIED TRIGGER: ICD-10-CM

## 2021-07-07 DIAGNOSIS — H90.3 SENSORINEURAL HEARING LOSS (SNHL) OF BOTH EARS: Primary | ICD-10-CM

## 2021-07-07 PROCEDURE — 4004F PT TOBACCO SCREEN RCVD TLK: CPT | Performed by: OTOLARYNGOLOGY

## 2021-07-07 PROCEDURE — 99999 PR OFFICE/OUTPT VISIT,PROCEDURE ONLY: CPT | Performed by: AUDIOLOGIST

## 2021-07-07 PROCEDURE — G8419 CALC BMI OUT NRM PARAM NOF/U: HCPCS | Performed by: OTOLARYNGOLOGY

## 2021-07-07 PROCEDURE — G8427 DOCREV CUR MEDS BY ELIG CLIN: HCPCS | Performed by: OTOLARYNGOLOGY

## 2021-07-07 PROCEDURE — 99214 OFFICE O/P EST MOD 30 MIN: CPT | Performed by: OTOLARYNGOLOGY

## 2021-07-07 RX ORDER — FLUTICASONE PROPIONATE 50 MCG
1 SPRAY, SUSPENSION (ML) NASAL DAILY
Qty: 2 BOTTLE | Refills: 1 | Status: SHIPPED | OUTPATIENT
Start: 2021-07-07 | End: 2021-08-24

## 2021-07-07 NOTE — PROGRESS NOTES
[facility]- HEAD & NECK SURGERY  Follow up      Patient Name: Cristin Springer  Medical Record Number:  <[id number]  Primary Care Physician:  Angélica Bryant MD  Date of Consultation: 7/7/2021    Chief Complaint: Hearing loss        Interval History  Patient presents for follow-up of his hearing loss. He was evaluated at the [facility] for cochlear implant, but given that he had a mixed component to his hearing loss I do not feel it is though he was a candidate yet. Patient is not interested in a cochlear implant at this time either way. Planes of nasal congestion and rhinorrhea. Is not taking any medications for this. REVIEW OF SYSTEMS    As above  PHYSICAL EXAM  GENERAL: No Acute Distress, Alert and Oriented, no Hoarseness, strong voice  EYES: EOMI, Anti-icteric  HENT:   Head: Normocephalic and atraumatic. Face:  Symmetric, facial nerve intact, no sinus tenderness  Right Ear: Thickened tympanic membrane that is intact without signs of infection  Left Ear: Again thickened tympanic membrane without signs of infection  Mouth/Oral Cavity:  normal lips, Uvula is midline, no mucosal lesions  Oropharynx/Larynx:  normal oropharynx  Nose:Normal external nasal appearance. Anterior rhinoscopy shows a severe rightward septal deviation with some clear drainage. Large turbinates  NECK: Normal range of motion, no thyromegaly, trachea is midline, no lymphadenopathy, no neck masses, no crepitus  CHEST: Normal respiratory effort, no retractions, breathing comfortably  SKIN: No rashes, normal appearing skin, no evidence of skin lesions/tumors  Neuro:  cranial nerve II-XII intact; normal gait  Cardio:  no edema      ASSESSMENT/PLAN  1. Mixed conductive and sensorineural hearing loss of both ears  Patient would like to continue with hearing aids at this time. If they stop helping, the only option at this point would be cochlear implant.   If his hearing loss progresses he will pursue this. 2. Deviated septum  Severely deviated septum. Starting him on Flonase. He is going to follow-up in 1 month and if he is having ongoing nasal issues could consider septoplasty and turbinate reduction    3. Allergic rhinitis, unspecified seasonality, unspecified trigger  Start Flonase  4. Hypertrophy of inferior nasal turbinate  Flonase             I have performed a head and neck physical exam personally or was physically present during the key or critical portions of the service. This note was generated completely or in part utilizing Dragon dictation speech recognition software. Occasionally, words are mistranscribed and despite editing, the text may contain inaccuracies due to incorrect word recognition. If further clarification is needed please contact the office at [phone].

## 2021-07-07 NOTE — LETTER
[facility]  [address] [address]  Phone: [phone]  Fax: [phone]    Jennie Ferguson MD        July 7, 2021    Courtney Angela  [address] [facility] [address]      To whom it may concern    Mr. Alessia Cabezas has profound hearing loss. Due to this loss, he is unable to hear traditional alarm clocks. Please take this into consideration. If you have any questions or concerns, please don't hesitate to call.     Sincerely,          Jennie Ferguson MD

## 2021-08-24 ENCOUNTER — OFFICE VISIT (OUTPATIENT)
Dept: ORTHOPEDIC SURGERY | Age: 41
End: 2021-08-24
Payer: COMMERCIAL

## 2021-08-24 VITALS — BODY MASS INDEX: 29.55 KG/M2 | WEIGHT: 195 LBS | HEIGHT: 68 IN

## 2021-08-24 DIAGNOSIS — M54.50 LUMBAR PAIN: Primary | ICD-10-CM

## 2021-08-24 DIAGNOSIS — M51.36 DDD (DEGENERATIVE DISC DISEASE), LUMBAR: ICD-10-CM

## 2021-08-24 DIAGNOSIS — M43.06 LUMBAR SPONDYLOLYSIS: ICD-10-CM

## 2021-08-24 DIAGNOSIS — M51.36 LUMBAR ADJACENT SEGMENT DISEASE WITH SPONDYLOLISTHESIS: ICD-10-CM

## 2021-08-24 DIAGNOSIS — M43.16 LUMBAR ADJACENT SEGMENT DISEASE WITH SPONDYLOLISTHESIS: ICD-10-CM

## 2021-08-24 PROCEDURE — L0642 LO SAG RI AN/POS PNL PRE OTS: HCPCS | Performed by: PHYSICIAN ASSISTANT

## 2021-08-24 PROCEDURE — 99204 OFFICE O/P NEW MOD 45 MIN: CPT | Performed by: PHYSICIAN ASSISTANT

## 2021-08-24 PROCEDURE — G8419 CALC BMI OUT NRM PARAM NOF/U: HCPCS | Performed by: PHYSICIAN ASSISTANT

## 2021-08-24 PROCEDURE — G8427 DOCREV CUR MEDS BY ELIG CLIN: HCPCS | Performed by: PHYSICIAN ASSISTANT

## 2021-08-24 PROCEDURE — 4004F PT TOBACCO SCREEN RCVD TLK: CPT | Performed by: PHYSICIAN ASSISTANT

## 2021-08-24 RX ORDER — HYDROXYZINE HYDROCHLORIDE 25 MG/1
25 TABLET, FILM COATED ORAL 3 TIMES DAILY PRN
COMMUNITY
Start: 2021-06-15

## 2021-08-24 RX ORDER — MELOXICAM 15 MG/1
15 TABLET ORAL DAILY
Qty: 30 TABLET | Refills: 0 | Status: SHIPPED | OUTPATIENT
Start: 2021-08-24

## 2021-08-24 RX ORDER — METHYLPREDNISOLONE 4 MG/1
TABLET ORAL
COMMUNITY
Start: 2021-08-23

## 2021-08-24 RX ORDER — TIZANIDINE 4 MG/1
TABLET ORAL
COMMUNITY
Start: 2021-08-23

## 2021-08-25 ENCOUNTER — TELEPHONE (OUTPATIENT)
Dept: ORTHOPEDIC SURGERY | Age: 41
End: 2021-08-25

## 2021-08-25 NOTE — TELEPHONE ENCOUNTER
Called & spoke with the patient informing him that his MRI was approved & he can go ahead and get it scheduled. Patient is to schedule the MRI at [facility], he will call to schedule the MRI, patient will then schedule a f/u appointment after the MRI is completed.

## 2021-08-26 ENCOUNTER — HOSPITAL ENCOUNTER (EMERGENCY)
Age: 41
Discharge: HOME | End: 2021-08-26
Attending: EMERGENCY MEDICINE
Payer: COMMERCIAL

## 2021-08-26 VITALS
OXYGEN SATURATION: 97 % | TEMPERATURE: 98.2 F | HEART RATE: 78 BPM | WEIGHT: 185 LBS | BODY MASS INDEX: 28.04 KG/M2 | HEIGHT: 68 IN | DIASTOLIC BLOOD PRESSURE: 74 MMHG | SYSTOLIC BLOOD PRESSURE: 108 MMHG | RESPIRATION RATE: 18 BRPM

## 2021-08-26 DIAGNOSIS — S39.012A STRAIN OF LUMBAR REGION, INITIAL ENCOUNTER: Primary | ICD-10-CM

## 2021-08-26 PROCEDURE — 99283 EMERGENCY DEPT VISIT LOW MDM: CPT

## 2021-08-26 PROCEDURE — 6370000000 HC RX 637 (ALT 250 FOR IP): Performed by: EMERGENCY MEDICINE

## 2021-08-26 PROCEDURE — 96372 THER/PROPH/DIAG INJ SC/IM: CPT

## 2021-08-26 PROCEDURE — 6360000002 HC RX W HCPCS: Performed by: EMERGENCY MEDICINE

## 2021-08-26 RX ORDER — KETOROLAC TROMETHAMINE 10 MG/1
10 TABLET, FILM COATED ORAL EVERY 6 HOURS PRN
Qty: 14 TABLET | Refills: 0 | Status: SHIPPED | OUTPATIENT
Start: 2021-08-26

## 2021-08-26 RX ORDER — KETOROLAC TROMETHAMINE 30 MG/ML
30 INJECTION, SOLUTION INTRAMUSCULAR; INTRAVENOUS ONCE
Status: COMPLETED | OUTPATIENT
Start: 2021-08-26 | End: 2021-08-26

## 2021-08-26 RX ORDER — HYDROCODONE BITARTRATE AND ACETAMINOPHEN 5; 325 MG/1; MG/1
2 TABLET ORAL ONCE
Status: COMPLETED | OUTPATIENT
Start: 2021-08-26 | End: 2021-08-26

## 2021-08-26 RX ADMIN — KETOROLAC TROMETHAMINE 30 MG: 30 INJECTION, SOLUTION INTRAMUSCULAR at 03:50

## 2021-08-26 RX ADMIN — HYDROCODONE BITARTRATE AND ACETAMINOPHEN 2 TABLET: 5; 325 TABLET ORAL at 04:20

## 2021-08-26 ASSESSMENT — PAIN SCALES - GENERAL
PAINLEVEL_OUTOF10: 8
PAINLEVEL_OUTOF10: 7
PAINLEVEL_OUTOF10: 8
PAINLEVEL_OUTOF10: 7

## 2021-08-26 ASSESSMENT — ENCOUNTER SYMPTOMS
APNEA: 0
COLOR CHANGE: 0
RECTAL PAIN: 0
EYE PAIN: 0
COUGH: 0
CHEST TIGHTNESS: 0
ABDOMINAL PAIN: 0
STRIDOR: 0
ANAL BLEEDING: 0
DIARRHEA: 0
PHOTOPHOBIA: 0
VOMITING: 0
EYE REDNESS: 0
EYE DISCHARGE: 0
BACK PAIN: 1
WHEEZING: 0
CHOKING: 0
NAUSEA: 0
ABDOMINAL DISTENTION: 0
EYE ITCHING: 0
CONSTIPATION: 0
BLOOD IN STOOL: 0
SHORTNESS OF BREATH: 0

## 2021-08-26 ASSESSMENT — PAIN DESCRIPTION - PAIN TYPE
TYPE: ACUTE PAIN
TYPE: OTHER (COMMENT)

## 2021-08-26 ASSESSMENT — PAIN DESCRIPTION - LOCATION
LOCATION: BACK
LOCATION: BACK

## 2021-08-26 ASSESSMENT — PAIN DESCRIPTION - ORIENTATION: ORIENTATION: LOWER

## 2021-08-26 ASSESSMENT — PAIN DESCRIPTION - FREQUENCY: FREQUENCY: CONTINUOUS

## 2021-08-26 ASSESSMENT — PAIN DESCRIPTION - DESCRIPTORS
DESCRIPTORS: CONSTANT
DESCRIPTORS: ACHING;SPASM

## 2021-08-26 ASSESSMENT — PAIN - FUNCTIONAL ASSESSMENT: PAIN_FUNCTIONAL_ASSESSMENT: 0-10

## 2021-08-26 NOTE — ED PROVIDER NOTES
[address] [facility]      Pt Name: Clive Lamar  MRN: [id number]  [place] 1980  Date of evaluation: 8/26/2021  Provider: Kaden Heaton MD    [address] [place] [address]       Chief Complaint   Patient presents with    Back Pain     x 2 weeks; seen with ortho; no relief; no incontience of bowl or bladder       HISTORY OF PRESENT ILLNESS    Clive Lamar is a 38 y.o. male who presents to the emergency department with back pain. Patient endorses right lower lumbar back pain for 2 weeks. States it radiates down the right leg.  has been taking over-the-counter medications and steroids with minimal relief. Saw a spine doctor and has an MRI scheduled for Friday. No weakness, saddle numbness, loss of bowel or bladder function. States pain is 10 out of 10 sharp in nature. Denies trauma or fall. No other associated symptoms. Rest makes symptoms better. Movement makes symptoms worse. Denies IV drug use. Nursing Notes were reviewed. Including nursing noted for FM, Surgical History, Past Medical History, Social History, vitals, and allergies; agree with all. REVIEW OF SYSTEMS       Review of Systems   Constitutional: Negative for activity change, appetite change, chills, diaphoresis, fatigue, fever and unexpected weight change. HENT: Negative for congestion, dental problem, drooling, ear discharge and ear pain. Eyes: Negative for photophobia, pain, discharge, redness, itching and visual disturbance. Respiratory: Negative for apnea, cough, choking, chest tightness, shortness of breath, wheezing and stridor. Cardiovascular: Negative for chest pain, palpitations and leg swelling. Gastrointestinal: Negative for abdominal distention, abdominal pain, anal bleeding, blood in stool, constipation, diarrhea, nausea, rectal pain and vomiting. Endocrine: Negative for cold intolerance and heat intolerance.    Genitourinary: Negative for decreased urine volume and urgency. Musculoskeletal: Positive for back pain. Negative for arthralgias. Skin: Negative for color change and pallor. Neurological: Negative for dizziness and facial asymmetry. Hematological: Negative for adenopathy. Does not bruise/bleed easily. Psychiatric/Behavioral: Negative for agitation, behavioral problems, confusion and decreased concentration. Except as noted above the remainder of the review of systems was reviewed and negative. PAST MEDICAL HISTORY   No past medical history on file. SURGICAL HISTORY     No past surgical history on file. CURRENT MEDICATIONS       Discharge Medication List as of 8/26/2021  4:20 AM      CONTINUE these medications which have NOT CHANGED    Details   hydrOXYzine (ATARAX) 25 MG tablet Take 25 mg by mouth 3 times daily as neededHistorical Med      methylPREDNISolone (MEDROL DOSEPACK) 4 MG tablet Historical Med      tiZANidine (ZANAFLEX) 4 MG tablet Historical Med      meloxicam (MOBIC) 15 MG tablet Take 1 tablet by mouth daily, Disp-30 tablet, R-0Normal      rOPINIRole (REQUIP) 1 MG tablet Take 1 mg by mouth 3 times dailyHistorical Med             ALLERGIES     Topiramate    FAMILY HISTORY      No family history on file.     SOCIAL HISTORY       Social History     Socioeconomic History    Marital status:      Spouse name: Not on file    Number of children: Not on file    Years of education: Not on file    Highest education level: Not on file   Occupational History    Not on file   Tobacco Use    Smoking status: Current Every Day Smoker     Packs/day: 1.00     Years: 22.00     Pack years: 22.00     Types: Cigarettes    Smokeless tobacco: Never Used   Substance and Sexual Activity    Alcohol use: Not on file    Drug use: Not on file    Sexual activity: Not on file   Other Topics Concern    Not on file   Social History Narrative    Not on file     Social Determinants of Health     Financial Resource Strain:    [place] Difficulty of Paying Living Expenses:    Food Insecurity:     Worried About Running Out of Food in the Last Year:     [address] in the Last Year:    Transportation Needs:     Lack of Transportation (Medical):  Lack of Transportation (Non-Medical):    Physical Activity:     Days of Exercise per Week:     Minutes of Exercise per Session:    Stress:     Feeling of Stress :    Social Connections:     Frequency of Communication with Friends and Family:     Frequency of Social Gatherings with Friends and Family:     Attends Zoroastrianism Services:     Active Member of Clubs or Organizations:     Attends Club or Organization Meetings:     Marital Status:    Intimate Partner Violence:     Fear of Current or Ex-Partner:     Emotionally Abused:     Physically Abused:     Sexually Abused:        PHYSICAL EXAM       ED Triage Vitals [08/26/21 0303]   BP Temp Temp Source Pulse Resp SpO2 Height Weight   108/74 98.2 °F (36.8 °C) Oral 78 18 97 % 5' 8\" (1.727 m) 185 lb (83.9 kg)       Physical Exam  Vitals and nursing note reviewed. Constitutional:       General: He is not in acute distress. Appearance: He is well-developed. He is not diaphoretic. HENT:      Head: Normocephalic and atraumatic. Eyes:      General:         Right eye: No discharge. Left eye: No discharge. Pupils: Pupils are equal, round, and reactive to light. Neck:      Thyroid: No thyromegaly. Trachea: No tracheal deviation. Cardiovascular:      Rate and Rhythm: Normal rate and regular rhythm. Heart sounds: No murmur heard. Pulmonary:      Breath sounds: No wheezing or rales. Chest:      Chest wall: No tenderness. Abdominal:      General: There is no distension. Palpations: Abdomen is soft. There is no mass. Tenderness: There is no abdominal tenderness. There is no guarding or rebound. Musculoskeletal:         General: Tenderness present. No deformity. Normal range of motion.       Cervical back: Normal range of motion. Comments: Minimal tenderness palpation paraspinous region lumbar no spinal tenderness to palpation. Skin:     General: Skin is warm. Coloration: Skin is not pale. Findings: No erythema or rash. Neurological:      General: No focal deficit present. Mental Status: He is alert and oriented to person, place, and time. Cranial Nerves: No cranial nerve deficit. Sensory: No sensory deficit. Motor: No weakness or abnormal muscle tone. Coordination: Coordination normal.      Gait: Gait normal.      Deep Tendon Reflexes: Reflexes normal.         DIAGNOSTIC RESULTS     EKG: All EKG's are interpreted by the Emergency Department Physician who either signs or Co-signs this chart in the absence of acardiologist.    None    RADIOLOGY:   Non-plain film images such as CT, Ultrasoundand MRI are read by the radiologist. Plain radiographic images are visualized and preliminarily interpreted by the emergency physician with the below findings:    4 views lumbar spine 8/24/2021 show L5-S1 spondylolisthesis secondary to spondylolysis, L1 endplate irregularities possibly due to Schmorl's nodes, multilevel DDD/spondylosis and facet arthropathy. Mild instability upon flexion. There is no high-grade instability. ED BEDSIDE ULTRASOUND:   Performed by ED Physician - none    LABS:  Labs Reviewed - No data to display    All other labs were withinnormal range or not returned as of this dictation. EMERGENCY DEPARTMENT COURSE and DIFFERENTIAL DIAGNOSIS/MDM:     PMH, Surgical Hx, FH, Social Hx reviewed by myself (ETOH usage, Tobacco usage, Drug usage reviewed by myself, no pertinent Hx)- No Pertinent Hx     Old records were reviewed by me    40-year-old female with back pain. No cord compression symptoms. No IV drug use. Pain well controlled. Had an x-ray that was done outpatient. Has an MRI scheduled for Friday. Is being followed by orthopedics.   Is already on a steroid. Pain well controlled the emergency room. Discussed strict return precautions with patient. I estimate there is LOW risk for Sepsis, MI, Stroke, Tamponade, PTX, Toxicity or other life threatening etiology thus I consider the discharge disposition reasonable. The patient is at low risk for mortality based on demographic, history and clinical factors. Given the best available information and clinical assessment, I estimate the risk of hospitalization to be greater than risk of treatment at home. I have explained to the patient that the risk could rapidly change, given precautions for return and instructions. Explained to patient that the risk for mortality is low based on demographic, history and clinical factors. I discussed with patient the results of evaluation in the ED, diagnosis, care, and prognosis. The plan is to discharge to home. Patient is in agreement with plan and questions have been answered. I also discussed with patient the reasons which may require a return visit and the importance of follow-up care. The patient is well-appearing, nontoxic, and improved at the time of discharge. Patient agrees to call to arrange follow-up care as directed. Patient understands to return immediately for worsening/change in symptoms. CRITICAL CARE TIME   Total Critical Caretime was 21 minutes, excluding separately reportable procedures. There was a high probability of clinically significant/life threatening deterioration in the patient's condition which required my urgent intervention. PROCEDURES:  Unlessotherwise noted below, none    FINAL IMPRESSION      1.  Strain of lumbar region, initial encounter          DISPOSITION/PLAN   DISPOSITION Decision To Discharge 08/26/2021 04:29:14 AM    PATIENT REFERRED TO:  Cass Stinson MD  [address] [facility] [address]  [address] [address]  Jalen Alfaro:  Discharge Medication List as of 8/26/2021  4:20 AM      START taking these medications    Details   ketorolac (TORADOL) 10 MG tablet Take 1 tablet by mouth every 6 hours as needed for Pain, Disp-14 tablet, R-0Print                (Please note that portions ofthis note were completed with a voice recognition program.  Efforts were made to edit the dictations but occasionally words are mis-transcribed.)    Victoria Diego MD(electronically signed)  Attending Emergency Physician        Victoria Diego MD  08/26/21 3320

## 2021-08-27 ENCOUNTER — HOSPITAL ENCOUNTER (OUTPATIENT)
Dept: MRI IMAGING | Age: 41
Discharge: HOME | End: 2021-08-27
Payer: COMMERCIAL

## 2021-08-27 DIAGNOSIS — M43.06 LUMBAR SPONDYLOLYSIS: ICD-10-CM

## 2021-08-27 DIAGNOSIS — M43.16 LUMBAR ADJACENT SEGMENT DISEASE WITH SPONDYLOLISTHESIS: ICD-10-CM

## 2021-08-27 DIAGNOSIS — M54.50 LUMBAR PAIN: ICD-10-CM

## 2021-08-27 DIAGNOSIS — M51.36 LUMBAR ADJACENT SEGMENT DISEASE WITH SPONDYLOLISTHESIS: ICD-10-CM

## 2021-08-27 DIAGNOSIS — M51.36 DDD (DEGENERATIVE DISC DISEASE), LUMBAR: ICD-10-CM

## 2021-08-27 PROCEDURE — 72148 MRI LUMBAR SPINE W/O DYE: CPT

## 2021-09-01 ENCOUNTER — OFFICE VISIT (OUTPATIENT)
Dept: ORTHOPEDIC SURGERY | Age: 41
End: 2021-09-01
Payer: COMMERCIAL

## 2021-09-01 VITALS — BODY MASS INDEX: 28.04 KG/M2 | HEIGHT: 68 IN | WEIGHT: 185 LBS

## 2021-09-01 DIAGNOSIS — M43.16 SPONDYLOLISTHESIS OF LUMBAR REGION: Primary | ICD-10-CM

## 2021-09-01 DIAGNOSIS — M51.36 DDD (DEGENERATIVE DISC DISEASE), LUMBAR: ICD-10-CM

## 2021-09-01 PROCEDURE — 4004F PT TOBACCO SCREEN RCVD TLK: CPT | Performed by: PHYSICIAN ASSISTANT

## 2021-09-01 PROCEDURE — G8427 DOCREV CUR MEDS BY ELIG CLIN: HCPCS | Performed by: PHYSICIAN ASSISTANT

## 2021-09-01 PROCEDURE — G8419 CALC BMI OUT NRM PARAM NOF/U: HCPCS | Performed by: PHYSICIAN ASSISTANT

## 2021-09-01 PROCEDURE — 99214 OFFICE O/P EST MOD 30 MIN: CPT | Performed by: PHYSICIAN ASSISTANT

## 2021-09-01 NOTE — PROGRESS NOTES
Follow-up: SPINE    9/1/2021     CHIEF COMPLAINT:    Chief Complaint   Patient presents with    Follow-up     F/U TR MRI LUMBAR       HISTORY OF PRESENT ILLNESS:              The patient is a 38 y.o. male here to follow-up and review lumbar MRI for acute/chronic recurrent right > left low back pain. He states his symptoms became severe 3 weeks prior when working on a fence. He states he bent over and heard a \"pop\". Since that time he experienced severe aching and stabbing right greater than left low back pain. He also initially reported subjective leg weakness and associated tingling and burning in his feet bilaterally. His symptoms were increased with any activity. Reports some relief with resting. Conservative care now includes: MDP, meloxicam, bracing, chiropractics, Toradol, evaluation at an urgent care/ED, IM steroid and muscle relaxers. He does report good improvement at this time. He currently denies any recent bowel or bladder dysfunction or saddle anesthesia. Denies any recent fevers chills or infections. He currently denies any lower or upper extremity radiating symptoms. Overall much improved from initial visit. He reports a history of a DOUG with Dr. 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 Current/Past Treatment:   · Physical Therapy: Yes  · Chiropractic: Yes  Injection:     IM steroid  · Remote DOUG, beacon  Medications:            NSAIDS: Ibuprofen, Mobic, Toradol            Muscle relaxer:   Yes he is unable to recall name            Steriods:   MDP             Neuropathic medications:              Opioids:            Other:   · Surgery/Consult: No    Work Status/Functionality: . Past Medical History: Medical history form was reviewed today & scanned into the media tab  No past medical history on file.    Past Surgical History:     No past surgical history on file. Current Medications:     Current Outpatient Medications:     ketorolac (TORADOL) 10 MG tablet, Take 1 tablet by mouth every 6 hours as needed for Pain, Disp: 14 tablet, Rfl: 0    hydrOXYzine (ATARAX) 25 MG tablet, Take 25 mg by mouth 3 times daily as needed, Disp: , Rfl:     methylPREDNISolone (MEDROL DOSEPACK) 4 MG tablet, , Disp: , Rfl:     tiZANidine (ZANAFLEX) 4 MG tablet, , Disp: , Rfl:     meloxicam (MOBIC) 15 MG tablet, Take 1 tablet by mouth daily, Disp: 30 tablet, Rfl: 0    rOPINIRole (REQUIP) 1 MG tablet, Take 1 mg by mouth 3 times daily, Disp: , Rfl:   Allergies:  Topiramate  Social History:    reports that he has been smoking cigarettes. He has a 22.00 pack-year smoking history. He has never used smokeless tobacco.  Family History:   No family history on file. REVIEW OF SYSTEMS: Full ROS reviewed & scanned into chart  CONSTITUTIONAL: Denies unexplained weight loss, fevers   SKIN: Denies active skin conditions       PHYSICAL EXAM:    Vitals: Height 5' 8\" (1.727 m), weight 185 lb (83.9 kg). Pain score 4/10    GENERAL EXAM:  · General Apparence: Patient is adequately groomed with no evidence of malnutrition. · Orientation: The patient is oriented to time, place and person. · Mood & Affect:The patient's mood and affect are appropriate   · Lymphatic: The lymphatic examination bilaterally reveals all areas to be without enlargement or induration  · Sensation: Sensation is intact without deficit  · Coordination/Balance: Good coordination   LUMBAR/SACRAL EXAMINATION:  · Inspection: Local inspection shows no step-off or bruising. Lumbar alignment is normal.  Sagittal and Coronal balance is neutral.      · Palpation:   No evidence of tenderness at the midline--pain was initially located L5-S1 level  · Range of Motion: Intact flexion mild loss extension  · Strength:   Strength testing is 5/5 in all muscle groups tested.    · Special Tests:   Straight leg raise and crossed SLR negative. Leg length and pelvis level.  0 out of 5 Tiffany's signs. · Skin: There are no rashes, ulcerations or lesions. · Reflexes: Reflexes are brisk but symmetric 2-3+ at the patellar and ankle tendons. Clonus absent bilaterally at the feet. · Gait & station: Visibly antalgic unassisted  · Additional Examinations:   · RIGHT LOWER EXTREMITY: Inspection/examination of the right lower extremity does not show any tenderness, deformity or injury. Range of motion is full. There is no gross instability. There are no rashes, ulcerations or lesions. Strength and tone are normal.  · LEFT LOWER EXTREMITY:  Inspection/examination of the left lower extremity does not show any tenderness, deformity or injury. Range of motion is full. There is no gross instability. There are no rashes, ulcerations or lesions. Strength and tone are normal.    Diagnostic Testing:    Lumbar MRI scan report independently reviewed from August 2021 showing L5-S1 spondylolisthesis secondary to spondylolysis, mild central stenosis L2-3, varying degrees of multilevel foraminal stenosis and facet arthropathy. There is no high-grade central stenosis. ED notes reviewed    4 views lumbar spine 8/24/2021 show L5-S1 spondylolisthesis secondary to spondylolysis, L1 endplate irregularities possibly due to Schmorl's nodes, multilevel DDD/spondylosis and facet arthropathy. Mild instability upon flexion. There is no high-grade instability. CT scan May 2021 No evidence of cervical spine fracture or listhesis. Impression:  1) Acute/chronic low back pain, improved  2) L5-S1 spondylolisthesis, multilevel lumbar DDD/spondylosis  3) H/o remote DOUG        Plan:   1) We reviewed his lumbar MRI scan. We have a long discussion. He is overall feeling much improved at this time. I recommend a course of formal physical therapy with continued HEP.   Continue brace as needed    2) Discussed NSAIDs PRN     3) We briefly discussed indications for SYD's    4) F/u 4-6wks, he will cancel if symptoms are further improved      Lina Spence PA-C, MPAS  Board Certified by the [facility]

## 2021-09-03 ENCOUNTER — TELEPHONE (OUTPATIENT)
Dept: ORTHOPEDIC SURGERY | Age: 41
End: 2021-09-03

## 2021-09-09 ENCOUNTER — TELEPHONE (OUTPATIENT)
Dept: ORTHOPEDIC SURGERY | Age: 41
End: 2021-09-09

## 2021-11-04 ENCOUNTER — PROCEDURE VISIT (OUTPATIENT)
Dept: AUDIOLOGY | Age: 41
End: 2021-11-04

## 2021-11-04 DIAGNOSIS — H90.3 SENSORINEURAL HEARING LOSS (SNHL) OF BOTH EARS: Primary | ICD-10-CM

## 2021-11-04 PROCEDURE — 99999 PR OFFICE/OUTPT VISIT,PROCEDURE ONLY: CPT | Performed by: AUDIOLOGIST

## 2021-11-04 NOTE — PROGRESS NOTES
Ruby Kwok   1980, 42 y.o. male   [id number]     HEARING AID CHECK    RIGHT EAR: /MODEL: Phonak Donna P70 UP  WM: [id number]  EARMOLD/TUBING: [id number]/ 13 thick TRS    LEFT EAR: /MODEL: Phonak Donna P70 UP  AA: [id number] UNOSALV/MYTSOT: [id number]/ 13 thick TRS   BATTERIES: [address]  HAF: 6/17/21  WARRANTY: 8/30/24  TRIAL PERIOD ENDS:7/17/21  L&D ELIGIBLE: Yes     BUTTONS: ENABLED  PROGRAMS: 1) AUTOSENSE                          2) Phone  PHONE CONNECTIVITY: PAIRED TO PHONE AND TOSIN    Ruby Kwok was seen today, 11/4/2021, for a hearing aid check appointment. Patient here today for a tube change, AU. Tubing change, AU. Ran devices through [employer]. Listening check revealed excellent function, AU.         RECOMMENDATIONS:  - Continue consistent hearing aid use  - Return for hearing aid checks as needed  - HAC 3 months for tube change, patient to call to schedule    Caryn Mcconnell  Audiologist    NO CHARGE

## 2022-09-08 ENCOUNTER — TELEPHONE (OUTPATIENT)
Dept: AUDIOLOGY | Age: 42
End: 2022-09-08

## 2022-09-08 NOTE — TELEPHONE ENCOUNTER
Pt stopped in, he is requesting new \"hooks\" for his hearing aids. Last hearing aid check was 11/4/21; he also thinks the \"tubes\" need to be replaced. He is asking for a return call from Dr Sandra Moore.

## 2022-09-12 ENCOUNTER — PROCEDURE VISIT (OUTPATIENT)
Dept: AUDIOLOGY | Age: 42
End: 2022-09-12

## 2022-09-12 DIAGNOSIS — H90.3 SENSORINEURAL HEARING LOSS (SNHL) OF BOTH EARS: Primary | ICD-10-CM

## 2022-09-12 PROCEDURE — 99999 PR OFFICE/OUTPT VISIT,PROCEDURE ONLY: CPT | Performed by: AUDIOLOGIST

## 2022-09-12 NOTE — PROGRESS NOTES
Susan Capps   1980, 42 y.o. male   [id number]     HEARING AID CHECK    RIGHT EAR: /MODEL: Phonak Donna P70 UP  SN: [id number]  EARMOLD/TUBING: [id number]/ 13 thick TRS    LEFT EAR: /MODEL: Phonak Donna P70 UP  SN: [id number] EARMOLD/TUBING: [id number]/ 13 thick TRS   BATTERIES: 675  HAF: 6/17/21  WARRANTY: 8/30/24  TRIAL PERIOD ENDS:7/17/21  L&D ELIGIBLE: Yes     BUTTONS: ENABLED  PROGRAMS: 1) AUTOSENSE                          2) Phone  PHONE CONNECTIVITY: PAIRED TO PHONE AND TOSIN      Susan Capps was seen today, 9/12/2022, for a hearing aid check appointment. Patient noted tubing needs changed. Otoscopy: Unremarkable, AU    Hearing aids were cleaned and checked. Microphones were suctioned, tubing and ear hooks were changed, and devices completed a desiccant cycle through [employer]. Post-cleaning listening check revealed  function of the devices. Today was a courtesy HAC as we had not yet discussed end of service plan at last appointment. Discussed that at the next appointment there will be a $25 for just tubing change and a $60-$75 charge for full HAC depending on services rendered. Patient reports understanding and was satisfied with today's services.        RECOMMENDATIONS:  Continue consistent hearing aid use  Return for hearing aid checks as needed  HAC 3-6 months or sooner if needed    Caryn Puentes  Audiologist    NO CHARGE

## 2022-09-12 NOTE — PATIENT INSTRUCTIONS
Hearing Loss: Care Instructions  Your Care Instructions      Hearing loss is a sudden or slow decrease in how well you hear. It can range from mild to profound. Permanent hearing loss can occur with aging, and it can happen when you are exposed long-term to loud noise. Examples include listening to loud music, riding motorcycles, or being around other loud machines. Hearing loss can affect your work and home life. It can make you feel lonely or depressed. You may feel that you have lost your independence. But hearing aids and other devices can help you hear better and feel connected to others. Follow-up care is a key part of your treatment and safety. Be sure to make and go to all appointments, and call your doctor if you are having problems. It's also a good idea to know your test results and keep a list of the medicines you take. How can you care for yourself at home? Avoid loud noises whenever possible. This helps keep your hearing from getting worse. Always wear hearing protection around loud noises. If appropriate, wear hearing aid(s) as directed. It is recommended that hearing aids are worn during all waking hours to keep your brain active and give it access to the sounds it is missing. If you are beginning your process with hearing aid(s), schedule a \"Hearing Aid Evaluation\" with an audiologist to discuss your lifestyle, features of hearing aid technology, and styles of hearing aids available. It is recommended that you contact your insurance company to determine if you have a hearing aid benefit, as this may dictate who you can see for these services. Have hearing tests as your doctor suggests. They can show whether your hearing has changed. Your hearing aid may need to be adjusted. Use other assistive devices as needed. These may include:  Telephone amplifiers and hearing aids that can connect to a television, stereo, radio, or microphone. Devices that use lights or vibrations.  These alert you to the doorbell, a ringing telephone, or a baby monitor. Television closed-captioning. This shows the words at the bottom of the screen. Most new TVs can do this. TTY (text telephone). This lets you type messages back and forth on the telephone instead of talking or listening. These devices are also called TDD. When messages are typed on the keyboard, they are sent over the phone line to a receiving TTY. The message is shown on a monitor. Use pagers, fax machines, text, and email if it is hard for you to communicate by telephone. Try to learn a listening technique called speech-reading. It is not lip-reading. You pay attention to people's gestures, expressions, posture, and tone of voice. These clues can help you understand what a person is saying. Face the person you are talking to, and have him or her face you. Make sure the lighting is good. You need to see the other person's face clearly. Think about counseling if you need help to adjust to your hearing loss. When should you call for help? Watch closely for changes in your health, and be sure to contact your doctor if:    You think your hearing is getting worse. You have new symptoms, such as dizziness or nausea.

## 2023-01-19 ENCOUNTER — PROCEDURE VISIT (OUTPATIENT)
Dept: AUDIOLOGY | Age: 43
End: 2023-01-19
Payer: COMMERCIAL

## 2023-01-19 DIAGNOSIS — H90.3 SENSORINEURAL HEARING LOSS (SNHL) OF BOTH EARS: Primary | ICD-10-CM

## 2023-01-19 PROCEDURE — 92593 PR HEARING AID CHECK BINAURAL: CPT | Performed by: AUDIOLOGIST

## 2023-01-19 NOTE — PROGRESS NOTES
Amanda Meckel   1980, 36 y.o. male   [id number]     HEARING AID CHECK    RIGHT EAR: /MODEL: Phonak Donna P70 UP  SN: [id number]  EARMOLD/TUBING: [id number]/ 13 thick TRS    LEFT EAR: /MODEL: Phonak Donna P70 UP  SN: [id number] EARMOLD/TUBING: [id number]/ 13 thick TRS   BATTERIES: 675  HAF: 6/17/21  WARRANTY: 8/30/24  TRIAL PERIOD ENDS:7/17/21  L&D ELIGIBLE: Yes     BUTTONS: ENABLED  PROGRAMS: 1) AUTOSENSE                          2) Phone  PHONE CONNECTIVITY: PAIRED TO PHONE AND TOSIN      Amanda Meckel was seen today, 1/19/2023, for a hearing aid check appointment. Patient noted overall he is dong well. Some intermittency with bluetooth on cell. Otoscopy: Unremarkable, AU    Hearing aids were cleaned and checked. Microphones were suctioned, tubing was changed, and devices completed a desiccant cycle through [employer]. Post-cleaning listening check revealed excellent function of the devices. Changed to fixed bandwidth. Patient paid $25.00 at checkout for services rendered today.     RECOMMENDATIONS:  Continue consistent hearing aid use  Return for hearing aid checks as needed  HAC 3-6 months or sooner if needed (recommend 3 months for tubing change)    Caryn Melton  Audiologist    Patient paid $25.00 at checkout

## 2023-07-13 ENCOUNTER — TELEPHONE (OUTPATIENT)
Dept: ENT CLINIC | Age: 43
End: 2023-07-13

## 2023-07-13 NOTE — TELEPHONE ENCOUNTER
How Severe Is Your Eczema?: moderate Patient was contacted by the , he may come in for a 30 HAC today (7/13) or tomorrow (7/14).     Caryn Arevalo  Audiologist Is This A New Presentation, Or A Follow-Up?: Rash Additional History: Pt complains of eczema rash in genital area and elbow creases. Pr mother states that he was dx w/ eczema by derm and his pediatrician. Pt was taking fluocinide but complained of burning when it was applied. Pt is currently not taking any treatment for it.

## 2023-07-13 NOTE — TELEPHONE ENCOUNTER
Patient is wanting to know if he can come in today or tomorrow to get tubes and hooks replaced on his HA.

## 2023-07-14 ENCOUNTER — PROCEDURE VISIT (OUTPATIENT)
Dept: AUDIOLOGY | Age: 43
End: 2023-07-14

## 2023-07-14 DIAGNOSIS — H90.3 SENSORINEURAL HEARING LOSS (SNHL) OF BOTH EARS: Primary | ICD-10-CM

## 2023-07-14 NOTE — PROGRESS NOTES
Zeny Corley   1980, 36 y.o. male   [id number]     HEARING AID CHECK    RIGHT EAR: /MODEL: Phonak Donna P70 UP  SN: [id number]  EARMOLD/TUBING: [id number]/ 13 thick TRS    LEFT EAR: /MODEL: Phonak Donna P70 UP  SN: [id number] EARMOLD/TUBING: [id number]/ 13 thick TRS   BATTERIES: 675  HAF: 6/17/21  WARRANTY: 8/30/24  L&D ELIGIBLE: Yes     BUTTONS: ENABLED  PROGRAMS: 1) AUTOSENSE                          2) Phone  PHONE CONNECTIVITY: PAIRED TO PHONE AND TOSIN    Zeny Corley was seen today, 7/14/2023, for a hearing aid check appointment. Patient noted he is in need of a tubing change. Otoscopy: Unremarkable, AU    Hearing aids were cleaned and checked. Tubes and ear hooks were changed. Post-cleaning listening check revealed excellent function of the devices.        Patient paid $25.00 for tubing change    RECOMMENDATIONS:  Continue consistent hearing aid use  Return for hearing aid checks as needed  HAC 3-6 months or sooner if needed    Cheryl Garcia, Caryn  Audiologist    Patient paid $25.00 for tubing change

## 2023-10-27 ENCOUNTER — PROCEDURE VISIT (OUTPATIENT)
Dept: AUDIOLOGY | Age: 43
End: 2023-10-27

## 2023-10-27 DIAGNOSIS — H90.3 SENSORINEURAL HEARING LOSS (SNHL) OF BOTH EARS: Primary | ICD-10-CM

## 2023-10-27 NOTE — PROGRESS NOTES
Nisha Quintero   1980, 39 y.o. male   [id number]     HEARING AID CHECK    RIGHT EAR: /MODEL: Phonak Donna P70 UP  SN: [id number]  EARMOLD/TUBING: [id number]/ 13 thick TRS    LEFT EAR: /MODEL: Phonak Donna P70 UP  SN: [id number] EARMOLD/TUBING: [id number]/ 13 thick TRS   BATTERIES: 675  HAF: 6/17/21  WARRANTY: 8/30/24  L&D ELIGIBLE: Yes     BUTTONS: ENABLED  PROGRAMS: 1) AUTOSENSE                          2) Phone  PHONE CONNECTIVITY: PAIRED TO PHONE AND TOSIN    Nisha Quintero was seen today, 10/27/2023, for a hearing aid check appointment. Patient noted tubing needs to be changed. Otoscopy: Unremarkable, AU    Hearing aids were cleaned and checked. Microphones were suctioned, tubes were changed, and devices completed a desiccant cycle through [employer]. Post-cleaning listening check revealed excellent function of the devices. $25.00 due for tubing change today.      RECOMMENDATIONS:  Continue consistent hearing aid use  Return for hearing aid checks as needed  HAC 3-6 months or sooner if needed    Caryn Stoddard  Audiologist    Patient paid $25.00 at checkout

## 2023-11-06 ENCOUNTER — PROCEDURE VISIT (OUTPATIENT)
Dept: AUDIOLOGY | Age: 43
End: 2023-11-06

## 2023-11-06 DIAGNOSIS — H90.3 SENSORINEURAL HEARING LOSS (SNHL) OF BOTH EARS: Primary | ICD-10-CM

## 2023-11-06 PROCEDURE — NBSRV NON-BILLABLE SERVICE: Performed by: AUDIOLOGIST

## 2023-11-06 NOTE — PROGRESS NOTES
Carmel Argueta   1980, 39 y.o. male   [id number]     HEARING AID CHECK    RIGHT EAR: /MODEL: Phonak Donna P70 UP  SN: [id number]  EARMOLD/TUBING: [id number]/ 13 thick TRS    LEFT EAR: /MODEL: Phonak Donna P70 UP  SN: [id number] EARMOLD/TUBING: [id number]/ 13 thick TRS   BATTERIES: 675  HAF: 6/17/21  WARRANTY: 8/30/24  L&D ELIGIBLE: Yes     BUTTONS: ENABLED  PROGRAMS: 1) AUTOSENSE                          2) Phone  PHONE CONNECTIVITY: PAIRED TO PHONE AND GIOVANI    Carmel Argueta was seen today, 11/6/2023, for a hearing aid check appointment. Patient noted devices are intermittent. Sending both devices out for in-warranty repair. Programmed loaner devices (SN: [id number], [id number]) to patient's settings, paired to phone and giovani. Using patient EMs and tubing on loaners. See scanned loaner agreement. Patient to return in 2 weeks to pick-up repaired devices. Will call patient if devices arrive sooner.     RECOMMENDATIONS:  Continue consistent hearing aid use  Return for hearing aid checks as needed  HAC 2 weeks to pick-up repair and return loaners    Caryn Armendariz  Audiologist    NO CHARGE

## 2023-11-30 NOTE — PROGRESS NOTES
Alejandra Cabot   1980, 39 y.o. male   [id number]     HEARING AID CHECK    RIGHT EAR: /MODEL: Phonak Donna P70 UP  SN: [id number]  EARMOLD/TUBING: [id number]/ 13 thick TRS    LEFT EAR: /MODEL: Phonak Donna P70 UP  SN: [id number] EARMOLD/TUBING: [id number]/ 13 thick TRS   BATTERIES: 675  HAF: 6/17/21  WARRANTY: 8/30/24  L&D ELIGIBLE: Yes     BUTTONS: ENABLED  PROGRAMS: 1) AUTOSENSE                          2) Phone  PHONE CONNECTIVITY: PAIRED TO PHONE AND GIOVANI    Alejandra Cabot was seen today, 12/1/2023, for a hearing aid check appointment to  devices from repair. Patient noted good fit and sound quality. Loaner devices were returned in good condition. See scanned for loaner form. Loaded patient's settings into devices. Re-paired to phone and giovani. Patient to follow up in 3 months for tubing change, patient would like to call to schedule this.     RECOMMENDATIONS:  Continue consistent hearing aid use  Return for hearing aid checks as needed  HAC 3-6 months or sooner if needed    Dolores Fees, AuD  Audiologist    NO CHARGE

## 2023-12-01 ENCOUNTER — PROCEDURE VISIT (OUTPATIENT)
Dept: AUDIOLOGY | Age: 43
End: 2023-12-01

## 2023-12-01 DIAGNOSIS — H90.3 SENSORINEURAL HEARING LOSS (SNHL) OF BOTH EARS: Primary | ICD-10-CM

## 2023-12-01 PROCEDURE — NBSRV NON-BILLABLE SERVICE: Performed by: AUDIOLOGIST

## 2024-05-23 ENCOUNTER — PROCEDURE VISIT (OUTPATIENT)
Dept: AUDIOLOGY | Age: 44
End: 2024-05-23
Payer: COMMERCIAL

## 2024-05-23 DIAGNOSIS — H90.3 SENSORINEURAL HEARING LOSS (SNHL) OF BOTH EARS: Primary | ICD-10-CM

## 2024-05-23 PROCEDURE — 92593 PR HEARING AID CHECK BINAURAL: CPT | Performed by: AUDIOLOGIST

## 2024-05-23 NOTE — PROGRESS NOTES
Arsenio Amaya   1980, 43 y.o. male   [id number]     HEARING AID CHECK    RIGHT EAR: /MODEL: Phonak Donna P70 UP  SN: [id number]  EARMOLD/TUBING: [id number]/ 13 thick TRS    LEFT EAR: /MODEL: Phonak Donna P70 UP  SN: [id number] EARMOLD/TUBING: [id number]/ 13 thick TRS   BATTERIES: 675  HAF: 6/17/21  WARRANTY: 8/30/24  L&D ELIGIBLE: Yes     BUTTONS: ENABLED  PROGRAMS: 1) AUTOSENSE                          2) Phone  PHONE CONNECTIVITY: PAIRED TO PHONE AND TOSIN      Arsenio Amaya was seen today, 5/23/2024, for a hearing aid check- tubing change.    Otoscopy: Unremarkable, AU    Hearing aids were cleaned and checked.  Tubes and earhooks changed.     Patient is experiencing significant feedback, primarily in the left device. I believe he needs new ear molds as his current set are about 3 years old.     Took EMIs in office today without incident, will send to [facility]. Patient quoted $250.00 due at  ($75.00 per ear plus $50.00 per ear for EMIs).    Sales Order # [id number]     PO# [id number]    Patient to return in 3 weeks for HAC to be fit with new EMs, or sooner if needed.     $40.00 for tubing change    RECOMMENDATIONS:  Continue consistent hearing aid use  Return for hearing aid checks as needed  HAC 6-12 months or sooner if needed    Caryn Lozano  Audiologist    $40.00 paid at checkout

## 2024-06-21 ENCOUNTER — PROCEDURE VISIT (OUTPATIENT)
Dept: AUDIOLOGY | Age: 44
End: 2024-06-21

## 2024-06-21 DIAGNOSIS — H90.3 SENSORINEURAL HEARING LOSS (SNHL) OF BOTH EARS: Primary | ICD-10-CM

## 2024-06-21 NOTE — PROGRESS NOTES
Arsenio Amaya  1980.43 y.o. male   [id number]    RIGHT EAR: /MODEL: Phonak Donna P70 UP  SN: [id number]  EARMOLD/TUBING: [id number]/ 13 thick TRS ([id number])    LEFT EAR: /MODEL: Phonak Donna P70 UP  SN: [id number] EARMOLD/TUBING: [id number]/ 13 thick TRS ([id number])   BATTERIES: 675  HAF: 6/17/21  WARRANTY: 8/30/24  L&D ELIGIBLE: Yes     BUTTONS: ENABLED  PROGRAMS: 1) AUTOSENSE                          2) Phone  PHONE CONNECTIVITY: PAIRED TO PHONE AND TOSIN    EAR MOLD FITTING    Arsenio Amaya was seen today, 6/21/2024, for a ear mold fitting/re-tubing    PROCEDURES:     Otoscopy revealed: Clear ear canals bilaterally    Patient fit with new Tab ear molds (L SN: [id number] and R SN: [id number]) with new tone hooks. Tubing measure and patient reported comfort and good sound quality. Re-tubed old molds with new tone hooks as back-ups.  Hearing aids were cleaned and checked.   Microphone and  ports were suctioned, old ear molds cleaned, and devices completed a desiccant cycle through [employer].    Connected devices to fitting software. Ran feedback manager.   Datalogging revealed 15-20 hours of use per day.    Patient inquired about adjusting volume separately due to feedback in left ear. Counseled patient on independent volume on phone as unable to select independent volume in software. Patient reported understanding.     PATIENT EDUCATION:     - Verbally and visually reviewed importance of consistent use and care and maintenance of devices.      Information was verbally shared with patient during appointment.        RECOMMENDATIONS:     Continue consistent hearing aid use  Return for hearing aid checks as needed  Retest hearing as medically indicated and/or sooner if a change in hearing is noted.  Contact audiologist with questions/concerns as needed      **Patient paid $250.00 for today's appointment.    Caryn Weaver  Audiologist

## 2024-08-23 ENCOUNTER — PROCEDURE VISIT (OUTPATIENT)
Dept: AUDIOLOGY | Age: 44
End: 2024-08-23

## 2024-08-23 DIAGNOSIS — H90.3 SENSORINEURAL HEARING LOSS (SNHL) OF BOTH EARS: Primary | ICD-10-CM

## 2024-08-23 NOTE — PROGRESS NOTES
Arsenio Amaya  1980.43 y.o. male   [id number]    HEARING AID CHECK      RIGHT EAR: /MODEL: Phonak Donna P70 UP  SN: [id number]  EARMOLD/TUBING: [id number]/ 13 thick TRS    LEFT EAR: /MODEL: Phonak Donna P70 UP  SN: [id number] EARMOLD/TUBING: [id number]/ 13 thick TRS   BATTERIES: 675  HAF: 6/17/21  WARRANTY: 8/30/24  L&D ELIGIBLE: Yes     BUTTONS: ENABLED  PROGRAMS: 1) AUTOSENSE                          2) Phone  PHONE CONNECTIVITY: PAIRED TO PHONE AND TOSIN      Arsenio Amaya was seen today, 8/23/2024, for a hearing aid check appointment.     PROCEDURES:     Hearings aids will be sent into the  for an [id number] repair. Patient was sent home with a pair of loaner devices programmed with his settings, coupled with his ear molds. Changed tubing on his ear molds. Explained that these hearing aids are a generation newer, so sound may be better than with his hearing aids.     Signed trial device loaner agreement.  See below for device information:  Right Ear: Donna L70-UP (SN: [id number])  Left Ear: Donna L70-UP (SN: [id number])       PATIENT EDUCATION:     - Verbally and visually reviewed importance of consistent use and care and maintenance of devices.      Information was verbally shared with patient during appointment.        RECOMMENDATIONS:     Continue consistent hearing aid use  Return for a hearing aid check as needed  Retest hearing as medically indicated and/or sooner if a change in hearing is noted.  Contact audiologist with questions/concerns as needed   hearing aids upon return from the       **Patient paid $40 for today's appointment for the tubing change. Do not bill insurance.       Caryn Coronel  Audiologist

## 2024-09-20 ENCOUNTER — PROCEDURE VISIT (OUTPATIENT)
Dept: AUDIOLOGY | Age: 44
End: 2024-09-20

## 2024-09-20 DIAGNOSIS — H90.3 SENSORINEURAL HEARING LOSS (SNHL) OF BOTH EARS: Primary | ICD-10-CM
